# Patient Record
Sex: FEMALE | URBAN - METROPOLITAN AREA
[De-identification: names, ages, dates, MRNs, and addresses within clinical notes are randomized per-mention and may not be internally consistent; named-entity substitution may affect disease eponyms.]

---

## 2021-10-04 ENCOUNTER — APPOINTMENT (OUTPATIENT)
Dept: URBAN - METROPOLITAN AREA CLINIC 256 | Age: 81
Setting detail: DERMATOLOGY
End: 2021-10-04

## 2021-10-04 VITALS — RESPIRATION RATE: 14 BRPM | HEIGHT: 64 IN | WEIGHT: 135 LBS

## 2021-10-04 DIAGNOSIS — L40.0 PSORIASIS VULGARIS: ICD-10-CM

## 2021-10-04 PROCEDURE — OTHER PRESCRIPTION: OTHER

## 2021-10-04 PROCEDURE — OTHER COUNSELING: OTHER

## 2021-10-04 PROCEDURE — 99203 OFFICE O/P NEW LOW 30 MIN: CPT

## 2021-10-04 RX ORDER — BETAMETHASONE DIPROPIONATE 0.5 MG/ML
0.05% LOTION TOPICAL
Qty: 60 | Refills: 3 | Status: ERX | COMMUNITY
Start: 2021-10-04

## 2021-10-04 ASSESSMENT — LOCATION SIMPLE DESCRIPTION DERM
LOCATION SIMPLE: LEFT SCALP
LOCATION SIMPLE: RIGHT UPPER BACK

## 2021-10-04 ASSESSMENT — LOCATION DETAILED DESCRIPTION DERM
LOCATION DETAILED: RIGHT INFERIOR UPPER BACK
LOCATION DETAILED: LEFT CENTRAL FRONTAL SCALP

## 2021-10-04 ASSESSMENT — LOCATION ZONE DERM
LOCATION ZONE: TRUNK
LOCATION ZONE: SCALP

## 2022-06-06 ENCOUNTER — APPOINTMENT (OUTPATIENT)
Dept: URBAN - METROPOLITAN AREA CLINIC 256 | Age: 82
Setting detail: DERMATOLOGY
End: 2022-06-07

## 2022-06-06 VITALS — WEIGHT: 135 LBS | HEIGHT: 64 IN

## 2022-06-06 DIAGNOSIS — L40.0 PSORIASIS VULGARIS: ICD-10-CM

## 2022-06-06 PROCEDURE — OTHER COUNSELING: OTHER

## 2022-06-06 PROCEDURE — 99213 OFFICE O/P EST LOW 20 MIN: CPT

## 2022-06-06 PROCEDURE — OTHER PRESCRIPTION: OTHER

## 2022-06-06 RX ORDER — KETOCONAZOLE 20 MG/ML
APPLY THIN LAYER SHAMPOO, SUSPENSION TOPICAL
Qty: 120 | Refills: 6 | Status: ERX | COMMUNITY
Start: 2022-06-06

## 2022-06-06 RX ORDER — CLOBETASOL PROPIONATE 0.5 MG/ML
APPLY THIN LAYER SOLUTION TOPICAL
Qty: 25 | Refills: 6 | Status: ERX | COMMUNITY
Start: 2022-06-06

## 2022-06-06 ASSESSMENT — LOCATION DETAILED DESCRIPTION DERM
LOCATION DETAILED: LEFT CENTRAL FRONTAL SCALP
LOCATION DETAILED: RIGHT INFERIOR UPPER BACK
LOCATION DETAILED: RIGHT INFERIOR MEDIAL UPPER BACK
LOCATION DETAILED: LEFT MEDIAL FRONTAL SCALP

## 2022-06-06 ASSESSMENT — LOCATION SIMPLE DESCRIPTION DERM
LOCATION SIMPLE: RIGHT UPPER BACK
LOCATION SIMPLE: LEFT SCALP
LOCATION SIMPLE: RIGHT UPPER BACK
LOCATION SIMPLE: LEFT SCALP

## 2022-06-06 ASSESSMENT — LOCATION ZONE DERM
LOCATION ZONE: TRUNK
LOCATION ZONE: TRUNK
LOCATION ZONE: SCALP
LOCATION ZONE: SCALP

## 2023-03-29 ENCOUNTER — LAB REQUISITION (OUTPATIENT)
Dept: LAB | Facility: CLINIC | Age: 83
End: 2023-03-29

## 2023-03-29 PROCEDURE — 86481 TB AG RESPONSE T-CELL SUSP: CPT

## 2023-03-29 PROCEDURE — 36415 COLL VENOUS BLD VENIPUNCTURE: CPT

## 2023-03-31 LAB
GAMMA INTERFERON BACKGROUND BLD IA-ACNC: 0.02 IU/ML
M TB IFN-G BLD-IMP: NEGATIVE
M TB IFN-G CD4+ BCKGRND COR BLD-ACNC: 9.98 IU/ML
MITOGEN IGNF BCKGRD COR BLD-ACNC: 0 IU/ML
MITOGEN IGNF BCKGRD COR BLD-ACNC: 0.23 IU/ML
QUANTIFERON MITOGEN: 10 IU/ML
QUANTIFERON NIL TUBE: 0.02 IU/ML
QUANTIFERON TB1 TUBE: 0.02 IU/ML
QUANTIFERON TB2 TUBE: 0.25

## 2023-08-17 ENCOUNTER — APPOINTMENT (OUTPATIENT)
Dept: URBAN - METROPOLITAN AREA CLINIC 256 | Age: 83
Setting detail: DERMATOLOGY
End: 2023-08-17

## 2023-08-17 VITALS — HEIGHT: 64 IN | WEIGHT: 135 LBS

## 2023-08-17 DIAGNOSIS — L65.9 NONSCARRING HAIR LOSS, UNSPECIFIED: ICD-10-CM

## 2023-08-17 PROCEDURE — OTHER MIPS QUALITY: OTHER

## 2023-08-17 PROCEDURE — OTHER OTC TREATMENT REGIMEN: OTHER

## 2023-08-17 PROCEDURE — 99213 OFFICE O/P EST LOW 20 MIN: CPT

## 2023-08-17 PROCEDURE — OTHER COUNSELING: OTHER

## 2023-08-17 PROCEDURE — OTHER ADDITIONAL NOTES: OTHER

## 2023-08-17 ASSESSMENT — LOCATION SIMPLE DESCRIPTION DERM
LOCATION SIMPLE: POSTERIOR SCALP
LOCATION SIMPLE: SUPERIOR FOREHEAD

## 2023-08-17 ASSESSMENT — LOCATION DETAILED DESCRIPTION DERM
LOCATION DETAILED: SUPERIOR MID FOREHEAD
LOCATION DETAILED: POSTERIOR MID-PARIETAL SCALP

## 2023-08-17 ASSESSMENT — LOCATION ZONE DERM
LOCATION ZONE: FACE
LOCATION ZONE: SCALP

## 2023-08-17 NOTE — HPI: HAIR LOSS
How Did The Hair Loss Occur?: sudden in onset
Additional History: Patient states she was diagnosed with metastatic colon cancer in June 2023. She has decided not to treat the cancer. Patient states in March she lost her job of 30 years.

## 2023-08-17 NOTE — PROCEDURE: OTC TREATMENT REGIMEN
Patient Specific Otc Recommendations (Will Not Stick From Patient To Patient): Apply minoxidil 5% solution or foam to scalp once to twice daily.
Detail Level: Zone

## 2023-08-17 NOTE — PROCEDURE: ADDITIONAL NOTES
Detail Level: Simple
Additional Notes: - Discussed diagnosis such as telogen effluvium due to declining health from colon cancer. Differential diagnosis also includes androgenic alopecia which is caused by age or hormones. \\n- Discussed punch biopsy to determine definitive diagnosis, but patient defers at this time\\n- Patient inquired about prednisone. Discussed prednisone is not ideal for patient’s current condition as her scalp is not inflamed and prednisone isn't a treatment option for hair loss.\\n- Discussed trialing topical minoxidil 5% foam or solution for 2-3 months. If not improved at next visit, may consider biopsy or spironolactone.
Render Risk Assessment In Note?: no

## 2023-10-06 ENCOUNTER — APPOINTMENT (OUTPATIENT)
Dept: URBAN - METROPOLITAN AREA CLINIC 253 | Age: 83
Setting detail: DERMATOLOGY
End: 2023-10-06

## 2023-10-06 VITALS — RESPIRATION RATE: 14 BRPM | HEIGHT: 64 IN | WEIGHT: 140 LBS

## 2023-10-06 DIAGNOSIS — L65.9 NONSCARRING HAIR LOSS, UNSPECIFIED: ICD-10-CM

## 2023-10-06 PROCEDURE — OTHER PRESCRIPTION: OTHER

## 2023-10-06 PROCEDURE — OTHER OTC TREATMENT REGIMEN: OTHER

## 2023-10-06 PROCEDURE — OTHER COUNSELING: OTHER

## 2023-10-06 PROCEDURE — 99213 OFFICE O/P EST LOW 20 MIN: CPT

## 2023-10-06 PROCEDURE — OTHER ADDITIONAL NOTES: OTHER

## 2023-10-06 PROCEDURE — OTHER MIPS QUALITY: OTHER

## 2023-10-06 RX ORDER — FINASTERIDE 1 MG/1
1 MG TABLET, FILM COATED ORAL QD
Qty: 60 | Refills: 0 | Status: ERX | COMMUNITY
Start: 2023-10-06

## 2023-10-06 ASSESSMENT — LOCATION ZONE DERM
LOCATION ZONE: SCALP
LOCATION ZONE: FACE

## 2023-10-06 ASSESSMENT — LOCATION DETAILED DESCRIPTION DERM
LOCATION DETAILED: SUPERIOR MID FOREHEAD
LOCATION DETAILED: POSTERIOR MID-PARIETAL SCALP

## 2023-10-06 ASSESSMENT — LOCATION SIMPLE DESCRIPTION DERM
LOCATION SIMPLE: POSTERIOR SCALP
LOCATION SIMPLE: SUPERIOR FOREHEAD

## 2023-10-06 NOTE — PROCEDURE: MIPS QUALITY
Quality 111:Pneumonia Vaccination Status For Older Adults: Patient did not receive any pneumococcal conjugate or polysaccharide vaccine on or after their 60th birthday and before the end of the measurement period
Quality 226: Preventive Care And Screening: Tobacco Use: Screening And Cessation Intervention: Patient screened for tobacco use and is an ex/non-smoker
Quality 110: Preventive Care And Screening: Influenza Immunization: Influenza immunization was not ordered or administered, reason not given
Detail Level: Detailed
Quality 47: Advance Care Plan: Advance care planning not documented, reason not otherwise specified.
Quality 431: Preventive Care And Screening: Unhealthy Alcohol Use - Screening: Patient not identified as an unhealthy alcohol user when screened for unhealthy alcohol use using a systematic screening method
Quality 130: Documentation Of Current Medications In The Medical Record: Current Medications Documented

## 2023-10-06 NOTE — PROCEDURE: OTC TREATMENT REGIMEN
Patient Specific Otc Recommendations (Will Not Stick From Patient To Patient): topical minoxidil once to twice daily (twice daily is optimal).
Detail Level: Zone

## 2023-10-06 NOTE — PROCEDURE: ADDITIONAL NOTES
Additional Notes: Discussed that finasteride is likely a safer option than oral minoxidil due to risk of hypotension and falling. Informed patient that the addition of minoxidil could be considered in the future if the finasteride is not working as the patient is very interested in oral minoxidil. \\nRecommended patient look into wigs as they reported they are interested in this. Advised patient to inquire about extensions with a  to determine if the patient would be satisfied with the results. Recommended Guillermina Burns in [facility] and [facility] and [employer] for wigs.\\nDiscussed hair loss may be due to her metastic cancer and stress associated with this \\n\\nA clinical assistant was present for the exam. Care instructions were explained to the patient in detail. Told patient to call with any concerns or questions. The patient verbalized understanding and agreement of the education provided and the treatment plan. Encouraged patient to schedule a follow up appointment right after visit. At the end of the visit, all questions had been answered and the patient was satisfied with the visit. Additional Notes: Discussed that finasteride is likely a safer option than oral minoxidil due to risk of hypotension and falling. Informed patient that the addition of minoxidil could be considered in the future if the finasteride is not working as the patient is very interested in oral minoxidil. \\nRecommended patient look into wigs as they reported they are interested in this. Advised patient to inquire about extensions with a  to determine if the patient would be satisfied with the results. Recommended Guillermina Burns in [employer] and [employer] and [employer] for wigs.\\nDiscussed hair loss may be due to her metastic cancer and stress associated with this \\n\\nA clinical assistant was present for the exam. Care instructions were explained to the patient in detail. Told patient to call with any concerns or questions. The patient verbalized understanding and agreement of the education provided and the treatment plan. Encouraged patient to schedule a follow up appointment right after visit. At the end of the visit, all questions had been answered and the patient was satisfied with the visit.

## 2023-11-17 ENCOUNTER — RX ONLY (RX ONLY)
Age: 83
End: 2023-11-17

## 2023-11-17 RX ORDER — FINASTERIDE 1 MG/1
1 MG TABLET, FILM COATED ORAL QD
Qty: 30 | Refills: 0 | Status: ERX

## 2023-11-21 ENCOUNTER — APPOINTMENT (OUTPATIENT)
Dept: URBAN - METROPOLITAN AREA CLINIC 253 | Age: 83
Setting detail: DERMATOLOGY
End: 2023-11-21

## 2023-11-21 VITALS — WEIGHT: 135 LBS | RESPIRATION RATE: 14 BRPM | HEIGHT: 64 IN

## 2023-11-21 DIAGNOSIS — L65.9 NONSCARRING HAIR LOSS, UNSPECIFIED: ICD-10-CM

## 2023-11-21 PROCEDURE — OTHER PRESCRIPTION: OTHER

## 2023-11-21 PROCEDURE — 99213 OFFICE O/P EST LOW 20 MIN: CPT

## 2023-11-21 PROCEDURE — OTHER MIPS QUALITY: OTHER

## 2023-11-21 PROCEDURE — OTHER COUNSELING: OTHER

## 2023-11-21 PROCEDURE — OTHER ADDITIONAL NOTES: OTHER

## 2023-11-21 RX ORDER — FINASTERIDE 5 MG/1
TABLET, FILM COATED ORAL
Qty: 60 | Refills: 5 | Status: ERX | COMMUNITY
Start: 2023-11-21

## 2023-11-21 ASSESSMENT — LOCATION DETAILED DESCRIPTION DERM
LOCATION DETAILED: POSTERIOR MID-PARIETAL SCALP
LOCATION DETAILED: SUPERIOR MID FOREHEAD

## 2023-11-21 ASSESSMENT — LOCATION ZONE DERM
LOCATION ZONE: FACE
LOCATION ZONE: SCALP

## 2023-11-21 ASSESSMENT — LOCATION SIMPLE DESCRIPTION DERM
LOCATION SIMPLE: SUPERIOR FOREHEAD
LOCATION SIMPLE: POSTERIOR SCALP

## 2023-11-21 NOTE — PROCEDURE: ADDITIONAL NOTES
Additional Notes: She declines any SEs including depression. She is now working with a therapist. No headaches, dizziness, abdominal pain. Follow up with [facility] for cancer next month. Happy with her wig.\\n\\nA clinical assistant was present for the exam. Care instructions were explained to the patient in detail. Told patient to call with any concerns or questions. The patient verbalized understanding and agreement of the education provided and the treatment plan. Encouraged patient to schedule a follow up appointment right after visit. At the end of the visit, all questions had been answered and the patient was satisfied with the visit. Additional Notes: She declines any SEs including depression. She is now working with a therapist. No headaches, dizziness, abdominal pain. Follow up with [facility] for cancer next month. Happy with her wig.\\n\\nA clinical assistant was present for the exam. Care instructions were explained to the patient in detail. Told patient to call with any concerns or questions. The patient verbalized understanding and agreement of the education provided and the treatment plan. Encouraged patient to schedule a follow up appointment right after visit. At the end of the visit, all questions had been answered and the patient was satisfied with the visit.

## 2023-11-21 NOTE — PROCEDURE: ADDITIONAL NOTES
New admit [facility].  Brigida Fritz md [phone]  EUGENIO Roberts  No precert or pa required per ishaan Ref#  [id number]  Admitted for choledocholithiasis and elevated lft's.   Notified bed control and they have already called the patient.  Pk/cc   Detail Level: Simple

## 2023-11-21 NOTE — PROCEDURE: MIPS QUALITY
Quality 431: Preventive Care And Screening: Unhealthy Alcohol Use - Screening: Patient not identified as an unhealthy alcohol user when screened for unhealthy alcohol use using a systematic screening method
Quality 226: Preventive Care And Screening: Tobacco Use: Screening And Cessation Intervention: Patient screened for tobacco use and is an ex/non-smoker
Quality 130: Documentation Of Current Medications In The Medical Record: Current Medications Documented
Detail Level: Detailed
[place]

## 2024-03-23 ENCOUNTER — HOSPITAL ENCOUNTER (EMERGENCY)
Facility: CLINIC | Age: 84
Discharge: HOME | End: 2024-03-23
Attending: PHYSICIAN ASSISTANT | Admitting: PHYSICIAN ASSISTANT
Payer: COMMERCIAL

## 2024-03-23 VITALS
HEART RATE: 123 BPM | WEIGHT: 143.74 LBS | BODY MASS INDEX: 24.54 KG/M2 | SYSTOLIC BLOOD PRESSURE: 127 MMHG | RESPIRATION RATE: 13 BRPM | DIASTOLIC BLOOD PRESSURE: 85 MMHG | HEIGHT: 64 IN | OXYGEN SATURATION: 96 % | TEMPERATURE: 97.6 F

## 2024-03-23 DIAGNOSIS — I48.21 PERMANENT ATRIAL FIBRILLATION (H): ICD-10-CM

## 2024-03-23 DIAGNOSIS — N93.9 VAGINAL BLEEDING: ICD-10-CM

## 2024-03-23 DIAGNOSIS — C53.9: ICD-10-CM

## 2024-03-23 LAB
ABO/RH(D): NORMAL
ALBUMIN SERPL BCG-MCNC: 4.3 G/DL (ref 3.5–5.2)
ALP SERPL-CCNC: 93 U/L (ref 40–150)
ALT SERPL W P-5'-P-CCNC: 10 U/L (ref 0–50)
ANION GAP SERPL CALCULATED.3IONS-SCNC: 13 MMOL/L (ref 7–15)
ANTIBODY SCREEN: NEGATIVE
APTT PPP: 45 SECONDS (ref 22–38)
AST SERPL W P-5'-P-CCNC: 16 U/L (ref 0–45)
BASOPHILS # BLD AUTO: 0.1 10E3/UL (ref 0–0.2)
BASOPHILS NFR BLD AUTO: 1 %
BILIRUB SERPL-MCNC: 0.5 MG/DL
BUN SERPL-MCNC: 23.6 MG/DL (ref 8–23)
CALCIUM SERPL-MCNC: 9.8 MG/DL (ref 8.8–10.2)
CHLORIDE SERPL-SCNC: 101 MMOL/L (ref 98–107)
CREAT SERPL-MCNC: 0.91 MG/DL (ref 0.51–0.95)
DEPRECATED HCO3 PLAS-SCNC: 26 MMOL/L (ref 22–29)
EGFRCR SERPLBLD CKD-EPI 2021: 63 ML/MIN/1.73M2
EOSINOPHIL # BLD AUTO: 0.3 10E3/UL (ref 0–0.7)
EOSINOPHIL NFR BLD AUTO: 4 %
ERYTHROCYTE [DISTWIDTH] IN BLOOD BY AUTOMATED COUNT: 15.8 % (ref 10–15)
GLUCOSE SERPL-MCNC: 146 MG/DL (ref 70–99)
HCT VFR BLD AUTO: 39 % (ref 35–47)
HGB BLD-MCNC: 13.2 G/DL (ref 11.7–15.7)
HOLD SPECIMEN: NORMAL
IMM GRANULOCYTES # BLD: 0 10E3/UL
IMM GRANULOCYTES NFR BLD: 0 %
INR PPP: 1.39 (ref 0.85–1.15)
LYMPHOCYTES # BLD AUTO: 1.3 10E3/UL (ref 0.8–5.3)
LYMPHOCYTES NFR BLD AUTO: 16 %
MCH RBC QN AUTO: 31.5 PG (ref 26.5–33)
MCHC RBC AUTO-ENTMCNC: 33.8 G/DL (ref 31.5–36.5)
MCV RBC AUTO: 93 FL (ref 78–100)
MONOCYTES # BLD AUTO: 0.7 10E3/UL (ref 0–1.3)
MONOCYTES NFR BLD AUTO: 9 %
NEUTROPHILS # BLD AUTO: 5.4 10E3/UL (ref 1.6–8.3)
NEUTROPHILS NFR BLD AUTO: 70 %
NRBC # BLD AUTO: 0 10E3/UL
NRBC BLD AUTO-RTO: 0 /100
PLATELET # BLD AUTO: 257 10E3/UL (ref 150–450)
POTASSIUM SERPL-SCNC: 3.5 MMOL/L (ref 3.4–5.3)
PROT SERPL-MCNC: 7.7 G/DL (ref 6.4–8.3)
RBC # BLD AUTO: 4.19 10E6/UL (ref 3.8–5.2)
SODIUM SERPL-SCNC: 140 MMOL/L (ref 135–145)
SPECIMEN EXPIRATION DATE: NORMAL
WBC # BLD AUTO: 7.7 10E3/UL (ref 4–11)

## 2024-03-23 PROCEDURE — 36415 COLL VENOUS BLD VENIPUNCTURE: CPT | Performed by: PHYSICIAN ASSISTANT

## 2024-03-23 PROCEDURE — 99203 OFFICE O/P NEW LOW 30 MIN: CPT | Performed by: OBSTETRICS & GYNECOLOGY

## 2024-03-23 PROCEDURE — 99284 EMERGENCY DEPT VISIT MOD MDM: CPT | Mod: 25

## 2024-03-23 PROCEDURE — 85610 PROTHROMBIN TIME: CPT | Performed by: PHYSICIAN ASSISTANT

## 2024-03-23 PROCEDURE — 258N000003 HC RX IP 258 OP 636: Performed by: PHYSICIAN ASSISTANT

## 2024-03-23 PROCEDURE — 93005 ELECTROCARDIOGRAM TRACING: CPT

## 2024-03-23 PROCEDURE — 86900 BLOOD TYPING SEROLOGIC ABO: CPT | Performed by: PHYSICIAN ASSISTANT

## 2024-03-23 PROCEDURE — 96360 HYDRATION IV INFUSION INIT: CPT

## 2024-03-23 PROCEDURE — 85730 THROMBOPLASTIN TIME PARTIAL: CPT | Performed by: PHYSICIAN ASSISTANT

## 2024-03-23 PROCEDURE — 96361 HYDRATE IV INFUSION ADD-ON: CPT

## 2024-03-23 PROCEDURE — 85004 AUTOMATED DIFF WBC COUNT: CPT | Performed by: PHYSICIAN ASSISTANT

## 2024-03-23 PROCEDURE — 80053 COMPREHEN METABOLIC PANEL: CPT | Performed by: PHYSICIAN ASSISTANT

## 2024-03-23 RX ORDER — FERRIC SUBSULFATE 0.21 G/G
LIQUID TOPICAL ONCE
Status: DISCONTINUED | OUTPATIENT
Start: 2024-03-23 | End: 2024-03-23

## 2024-03-23 RX ORDER — FERRIC SUBSULFATE 0.21 G/G
LIQUID TOPICAL ONCE
Qty: 8 ML | Refills: 0 | Status: DISCONTINUED | OUTPATIENT
Start: 2024-03-23 | End: 2024-03-23 | Stop reason: HOSPADM

## 2024-03-23 RX ADMIN — SODIUM CHLORIDE 1000 ML: 9 INJECTION, SOLUTION INTRAVENOUS at 12:49

## 2024-03-23 ASSESSMENT — COLUMBIA-SUICIDE SEVERITY RATING SCALE - C-SSRS
1. IN THE PAST MONTH, HAVE YOU WISHED YOU WERE DEAD OR WISHED YOU COULD GO TO SLEEP AND NOT WAKE UP?: NO
2. HAVE YOU ACTUALLY HAD ANY THOUGHTS OF KILLING YOURSELF IN THE PAST MONTH?: NO
6. HAVE YOU EVER DONE ANYTHING, STARTED TO DO ANYTHING, OR PREPARED TO DO ANYTHING TO END YOUR LIFE?: NO

## 2024-03-23 ASSESSMENT — ACTIVITIES OF DAILY LIVING (ADL)
ADLS_ACUITY_SCORE: 35

## 2024-03-23 NOTE — PROGRESS NOTES
GYN consult    83-year-old with known metastatic colon cancer with extension to the cervix presented to [facility] ED today with vaginal bleeding    I was asked to see the patient in consultation for control of her vaginal bleeding.    Her medical records were reviewed.  A cervical biopsy had been performed on 3/6/2024 by Dr. Ge Richards at [facility].  The results were consistent with metastatic colon cancer.  Bleeding after that was controlled with Monsel solution.    She had represented with bleeding to Dr. Richards 2 days ago and again bleeding was controlled with Monsel solution.    She was advised to follow-up with her oncologist regarding a palliative treatment plan.    I was contacted by the ED after they noticed vaginal bleeding in a patient who is slightly tachycardic with a stable hemoglobin.    Physical examination was limited to the pelvis.  The external genitalia show typical postmenopausal changes.  Speculum examination showed a scant amount of dark blood in the vault.  At the cervical os there was a 3 to 4 cm necrotic appearing tumor with no active bleeding but slight venous ooze.  Monsel solution was applied with a sponge stick and then with a large Q-tip to apply pressure and bleeding quickly stopped.  I simply observed for bleeding for several minutes with no additional bleeding occurring and then gently remove the speculum.    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  They seem very aware that there is metastatic tumor at the cervix and that palliative treatment is warranted.  They have been very involved with her oncologist regarding this and I advised a update her on recent occurrences and see if treatment can be expedited.    I did inform them that the likelihood of this recurring is significant and any severe bleeding would again warrant emergency room evaluation.  This would be better addressed at an  institution with GYN oncology and/or radiation oncology available and she should look for advice from her oncologist as to the best institution.    Assessment: Metastatic colon cancer with extension to the cervix, biopsy proven.    Bleeding controlled with silver nitrate although this is expected to be a temporary solution and further management should be arranged in a timely fashion through her oncologist.    Mayco Rodgers MD OB/GYN

## 2024-03-23 NOTE — ED PROVIDER NOTES
History     Chief Complaint:  Vaginal Bleeding       The history is provided by the patient.      Kristina Ramirez is a 83 year old female, with a history of persistent atrial fibrillation on Eliquis and Coreg twice daily 25 mg, colon cancer with metastasis to cervical area, not currently on chemotherapy who presents for evaluation of vaginal bleeding.  The patient reports she has been dealing with some vaginal bleeding for several days now.  She was seen by her OB/GYN 2 days ago at which time they did a procedure to help stop that temporarily but the bleeding restarted and returned and seems heavier today.  Notes her heart is fast but it often does go fast especially when she is anxious due to her A-fib with RVR.  She does not feel dizzy, denies chest pain,  short of breath or lightheaded.  She denies abdominal or pelvic pain.  She last took her Eliquis dose this morning takes it twice daily for stroke prevention due to her A-fib but no history of strokes thromboembolism or heart valve replacement.  Also occasionally has small amount of blood in stool.  Independent Historian:   None - Patient Only    Review of External Notes:   I reviewed her OB note from 03/21/2024.  Note Pelaez solution utilized to cauterize apparent cervical malignancy wound bleeding. I also reviewed recent pelvic US from February showing fibroids.      Medications:    No current outpatient medications on file.      Past Medical History:    No past medical history on file.    Past Surgical History:    No past surgical history on file.     Physical Exam   Patient Vitals for the past 24 hrs:   BP Temp Temp src Pulse Resp SpO2 Height Weight   03/23/24 1517 -- -- -- (!) 123 -- -- -- --   03/23/24 1516 -- -- -- (!) 128 -- -- -- --   03/23/24 1515 -- -- -- (!) 123 -- -- -- --   03/23/24 1511 -- -- -- -- 13 96 % -- --   03/23/24 1332 -- -- -- 118 26 97 % -- --   03/23/24 1317 127/85 -- -- 113 23 95 % -- --   03/23/24 1232 128/85 -- -- (!) 122 30 97  "% -- --   03/23/24 1217 123/76 97.6  F (36.4  C) Temporal (!) 142 16 98 % 1.626 m (5' 4\") 65.2 kg (143 lb 11.8 oz)        Physical Exam  General: Awake, alert, non-toxic.  Head:  Scalp is NC/AT  Eyes:  Conjunctiva normal, PERRL  ENT:  The external nose and ears are normal.   Neck:  Normal range of motion without rigidity.  CV:  Tachycardic and irregularly irregular    No pathologic murmur, rubs, or gallops.  Resp:  Breath sounds are clear bilaterally    Non-labored, no retractions or accessory muscle use  Pelvic:  Normal external genitalia.  There is bleeding from abnormal appearing cervical tissue with likely tumorous growth.  This is mild to moderate.  KAREN without signs of active bleeding. (Performed in presence of female chaparone EDT)  Abdomen: Abdomen is soft, no distension, no tenderness, no masses.   MS:  No lower extremity edema/swelling.   Skin:  Warm and dry, No rash or lesions noted.  Neuro:  Alert and oriented.  GCS 15 Moves all extremities normal.  No facial asymmetry. Gait normal.  Psych: Awake. Alert. Normal affect. Appropriate interactions.      Emergency Department Course     ECG  ECG taken at 1232, ECG read at 1247  Atril fibrillation with rapid ventricular response  Low voltage QRS  Incomplete right bundle branch block  Septal infarct, age undetermined  Rate 109 bpm. UT interval * ms. QRS duration 92 ms. QT/QTc 358/482 ms. P-R-T axes * 10 -11.       Laboratory:  Labs Ordered and Resulted from Time of ED Arrival to Time of ED Departure   COMPREHENSIVE METABOLIC PANEL - Abnormal       Result Value    Sodium 140      Potassium 3.5      Carbon Dioxide (CO2) 26      Anion Gap 13      Urea Nitrogen 23.6 (*)     Creatinine 0.91      GFR Estimate 63      Calcium 9.8      Chloride 101      Glucose 146 (*)     Alkaline Phosphatase 93      AST 16      ALT 10      Protein Total 7.7      Albumin 4.3      Bilirubin Total 0.5     INR - Abnormal    INR 1.39 (*)    PARTIAL THROMBOPLASTIN TIME - Abnormal    aPTT 45 " (*)    CBC WITH PLATELETS AND DIFFERENTIAL - Abnormal    WBC Count 7.7      RBC Count 4.19      Hemoglobin 13.2      Hematocrit 39.0      MCV 93      MCH 31.5      MCHC 33.8      RDW 15.8 (*)     Platelet Count 257      % Neutrophils 70      % Lymphocytes 16      % Monocytes 9      % Eosinophils 4      % Basophils 1      % Immature Granulocytes 0      NRBCs per 100 WBC 0      Absolute Neutrophils 5.4      Absolute Lymphocytes 1.3      Absolute Monocytes 0.7      Absolute Eosinophils 0.3      Absolute Basophils 0.1      Absolute Immature Granulocytes 0.0      Absolute NRBCs 0.0     TYPE AND SCREEN, ADULT    ABO/RH(D) O POS      Antibody Screen Negative      SPECIMEN EXPIRATION DATE [id number]     ABO/RH TYPE AND SCREEN            Emergency Department Course & Assessments:    Interventions:  Medications   sodium chloride 0.9% BOLUS 1,000 mL (0 mLs Intravenous Stopped 3/23/24 1518)        Assessments:  1229 I examined the patient and obtained history as noted above.  I rechecked the patient.  Independent Interpretation (X-rays, CTs, rhythm strip):  NOne    Consultations/Discussion of Management or Tests:  I spoke with Dr. Vishal SAMANO who graciously came in to evaluate the patient.  He performed cautery of the cervical lesions and successful cessation of bleeding.  He advises extensive malignant tissue seen.       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  Disposition:  The patient was discharged.     Impression & Plan    CMS Diagnoses: None      MIPS (If applicable):  N/A    Medical Decision Makin-year-old female with a history of persistent A-fib on Eliquis as well as colon cancer with metastasis to cervix not on chemo who presents for evaluation of vaginal bleeding.  Found to be bleeding from several sites to the cervix.  These are concerning for malignancy.  Was just seen by OB 2 days ago prior and underwent ellis  solution application.  I did discuss with on-call OB/GYN who graciously came in to evaluate the patient and performed exam and was able to stop bleeding.  Patient in A-fib with RVR certainly could be some component of blood loss contributing to this however pressure stable hemoglobin and platelets normal and given cessation of bleeding at this time the patient declines further observation would like to go home understands the potential risks associated with this but I think this is reasonable given bleeding has stopped at this time.  Feels there is likely some component of anxiety to the RVR at this time which is certainly possible.  She has no fever or leukocytosis or infectious symptoms to suggest sepsis and has a benign exam without tenderness I do not think there is any indication for emergent abdominal imaging.  She has no chest pain or shortness of breath to suggest cardiopulmonary process.  There is no evidence of other active bleeding or GI bleed though certainly could have low-grade GI bleed due to her known colon malignancies.  Coags mildly elevated likely due to her Eliquis.  Since she is only on Eliquis for stroke prevention of A-fib we have decided to hold this for time being as risk of bleeding likely outweighs benefit.  She is in agreement with this.  She will also take another half dose of her Coreg to help bring down her heart rates declines additional medicine here.  Was previously on digoxin but when off due to low heart rates might need to consider going back on higher dose or other medication if more persistent issues with tachycardia and she was tachycardic at recent visit.  Return immediately for recurrence of bleeding, dizziness, shortness of breath, pain, fever, chest pain or other new or worsening symptoms      Diagnosis:    ICD-10-CM    1. Vaginal bleeding  N93.9       2. Permanent atrial fibrillation (H)  I48.21       3. Cervical malignancy (H)  C53.9            Discharge  Medications:  There are no discharge medications for this patient.       Scribe Disclosure:  I, Jonevernon Marshall, am serving as a scribe at 12:35 PM on 3/23/2024 to document services personally performed by Walker Dennis PA-C based on my observations and the provider's statements to me.     3/23/2024   Walker Dennis PA-C Etten, Clark Ellsworth, PA-C  03/23/24 1808

## 2024-03-23 NOTE — DISCHARGE INSTRUCTIONS
Hold your Eliquis until you talk to your doctor on Monday.  You should take another half dose of your carvedilol when you get home and then take her normal nightly dose.  If your heart rates are persistently high or if your bleeding restarts or if you feel short of breath dizzy or have other symptoms or concerns you should return to the ER immediately.    Discharge Instructions  Vaginal Bleeding    You were seen today for unusual vaginal bleeding. Heavy or irregular bleeding may be caused by many different things such as hormone changes, infection, birth control, or cancer. At this time your provider does not find that your bleeding is a sign of anything dangerous or life-threatening, and you have not lost enough blood to be dangerous.  However, sometimes the signs of serious illness do not show up right away.  If you have new or worse symptoms, you may need to be seen again in the Emergency Department or by your primary provider.      Generally, every Emergency Department visit should have a follow-up clinic visit with either a primary or a specialty clinic/provider. Please follow-up as instructed by your emergency provider today.    Return to the Emergency Department if:  You feel lightheaded or faint.  Your bleeding becomes much heavier than it is now.  You have severe cramping or abdominal (belly) pain.  You have any new or different symptoms.    Treatment:  Motrin  or Advil  (ibuprofen) can help relieve cramps and can also decrease bleeding. You may use this according to the directions on the package. Do not use a medicine that you are allergic to, or if your provider has told you not to use it.  Hormone pills or birth control pills are occasionally prescribed to help control the bleeding but often this is left to an OB/GYN provider. These can cause problems if you have a history of blood clots or stroke, so tell your provider if you have these problems before you leave.  Iron tablets may be recommended if you  have anemia (low blood count.) Iron can cause constipation, so be sure to have plenty of fiber in your diet and let your provider know if you have problems.  Drinking plenty of fluid is important. Be sure to drink extra water or other healthy drinks.  If you were given a prescription for medicine here today, be sure to read all of the information (including the package insert) that comes with your prescription.  This will include important information about the medicine, its side effects, and any warnings that you need to know about.  The pharmacist who fills the prescription can provide more information and answer questions you may have about the medicine.  If you have questions or concerns that the pharmacist cannot address, please call or return to the Emergency Department.     Remember that you can always come back to the Emergency Department if you are not able to see your regular provider in the amount of time listed above, if you get any new symptoms, or if there is anything that worries you.

## 2024-03-25 LAB
ATRIAL RATE - MUSE: NORMAL BPM
DIASTOLIC BLOOD PRESSURE - MUSE: NORMAL MMHG
INTERPRETATION ECG - MUSE: NORMAL
P AXIS - MUSE: NORMAL DEGREES
PR INTERVAL - MUSE: NORMAL MS
QRS DURATION - MUSE: 92 MS
QT - MUSE: 358 MS
QTC - MUSE: 482 MS
R AXIS - MUSE: 10 DEGREES
SYSTOLIC BLOOD PRESSURE - MUSE: NORMAL MMHG
T AXIS - MUSE: -11 DEGREES
VENTRICULAR RATE- MUSE: 109 BPM

## 2024-09-09 ENCOUNTER — HOSPITAL ENCOUNTER (INPATIENT)
Facility: CLINIC | Age: 84
LOS: 1 days | Discharge: HOME | DRG: 309 | End: 2024-09-10
Attending: EMERGENCY MEDICINE | Admitting: INTERNAL MEDICINE
Payer: COMMERCIAL

## 2024-09-09 DIAGNOSIS — I48.91 ATRIAL FIBRILLATION WITH RVR (H): ICD-10-CM

## 2024-09-09 LAB
ANION GAP SERPL CALCULATED.3IONS-SCNC: 14 MMOL/L (ref 7–15)
BASOPHILS # BLD AUTO: 0.1 10E3/UL (ref 0–0.2)
BASOPHILS NFR BLD AUTO: 1 %
BUN SERPL-MCNC: 28.6 MG/DL (ref 8–23)
CALCIUM SERPL-MCNC: 10.2 MG/DL (ref 8.8–10.4)
CHLORIDE SERPL-SCNC: 95 MMOL/L (ref 98–107)
CREAT SERPL-MCNC: 1.28 MG/DL (ref 0.51–0.95)
EGFRCR SERPLBLD CKD-EPI 2021: 42 ML/MIN/1.73M2
EOSINOPHIL # BLD AUTO: 0.3 10E3/UL (ref 0–0.7)
EOSINOPHIL NFR BLD AUTO: 3 %
ERYTHROCYTE [DISTWIDTH] IN BLOOD BY AUTOMATED COUNT: 15.4 % (ref 10–15)
GLUCOSE SERPL-MCNC: 166 MG/DL (ref 70–99)
HCO3 SERPL-SCNC: 27 MMOL/L (ref 22–29)
HCT VFR BLD AUTO: 40.9 % (ref 35–47)
HGB BLD-MCNC: 13.3 G/DL (ref 11.7–15.7)
HOLD SPECIMEN: NORMAL
HOLD SPECIMEN: NORMAL
IMM GRANULOCYTES # BLD: 0.1 10E3/UL
IMM GRANULOCYTES NFR BLD: 1 %
LYMPHOCYTES # BLD AUTO: 2.1 10E3/UL (ref 0.8–5.3)
LYMPHOCYTES NFR BLD AUTO: 20 %
MCH RBC QN AUTO: 31.5 PG (ref 26.5–33)
MCHC RBC AUTO-ENTMCNC: 32.5 G/DL (ref 31.5–36.5)
MCV RBC AUTO: 97 FL (ref 78–100)
MONOCYTES # BLD AUTO: 1 10E3/UL (ref 0–1.3)
MONOCYTES NFR BLD AUTO: 9 %
NEUTROPHILS # BLD AUTO: 7.1 10E3/UL (ref 1.6–8.3)
NEUTROPHILS NFR BLD AUTO: 68 %
NRBC # BLD AUTO: 0 10E3/UL
NRBC BLD AUTO-RTO: 0 /100
PLATELET # BLD AUTO: 383 10E3/UL (ref 150–450)
POTASSIUM SERPL-SCNC: 3.9 MMOL/L (ref 3.4–5.3)
RBC # BLD AUTO: 4.22 10E6/UL (ref 3.8–5.2)
SODIUM SERPL-SCNC: 136 MMOL/L (ref 135–145)
TROPONIN T SERPL HS-MCNC: 12 NG/L
WBC # BLD AUTO: 10.5 10E3/UL (ref 4–11)

## 2024-09-09 PROCEDURE — 250N000013 HC RX MED GY IP 250 OP 250 PS 637: Performed by: EMERGENCY MEDICINE

## 2024-09-09 PROCEDURE — 99222 1ST HOSP IP/OBS MODERATE 55: CPT | Performed by: INTERNAL MEDICINE

## 2024-09-09 PROCEDURE — 96361 HYDRATE IV INFUSION ADD-ON: CPT | Mod: 59

## 2024-09-09 PROCEDURE — 85004 AUTOMATED DIFF WBC COUNT: CPT | Performed by: EMERGENCY MEDICINE

## 2024-09-09 PROCEDURE — 5A2204Z RESTORATION OF CARDIAC RHYTHM, SINGLE: ICD-10-PCS | Performed by: EMERGENCY MEDICINE

## 2024-09-09 PROCEDURE — 999N000055 HC STATISTIC END TITIAL CO2 MONITORING

## 2024-09-09 PROCEDURE — 80048 BASIC METABOLIC PNL TOTAL CA: CPT | Performed by: EMERGENCY MEDICINE

## 2024-09-09 PROCEDURE — 93005 ELECTROCARDIOGRAM TRACING: CPT | Mod: XU

## 2024-09-09 PROCEDURE — 999N000157 HC STATISTIC RCP TIME EA 10 MIN

## 2024-09-09 PROCEDURE — 96375 TX/PRO/DX INJ NEW DRUG ADDON: CPT

## 2024-09-09 PROCEDURE — 92960 CARDIOVERSION ELECTRIC EXT: CPT

## 2024-09-09 PROCEDURE — 36415 COLL VENOUS BLD VENIPUNCTURE: CPT | Performed by: EMERGENCY MEDICINE

## 2024-09-09 PROCEDURE — 250N000011 HC RX IP 250 OP 636: Mod: JW | Performed by: EMERGENCY MEDICINE

## 2024-09-09 PROCEDURE — 258N000003 HC RX IP 258 OP 636: Performed by: EMERGENCY MEDICINE

## 2024-09-09 PROCEDURE — 96365 THER/PROPH/DIAG IV INF INIT: CPT

## 2024-09-09 PROCEDURE — 250N000009 HC RX 250: Performed by: EMERGENCY MEDICINE

## 2024-09-09 PROCEDURE — 84484 ASSAY OF TROPONIN QUANT: CPT | Performed by: EMERGENCY MEDICINE

## 2024-09-09 PROCEDURE — 120N000001 HC R&B MED SURG/OB

## 2024-09-09 PROCEDURE — 96376 TX/PRO/DX INJ SAME DRUG ADON: CPT

## 2024-09-09 PROCEDURE — 83735 ASSAY OF MAGNESIUM: CPT | Performed by: INTERNAL MEDICINE

## 2024-09-09 PROCEDURE — 99285 EMERGENCY DEPT VISIT HI MDM: CPT | Mod: 25

## 2024-09-09 RX ORDER — AMOXICILLIN 250 MG
1 CAPSULE ORAL 2 TIMES DAILY PRN
Status: DISCONTINUED | OUTPATIENT
Start: 2024-09-09 | End: 2024-09-10 | Stop reason: HOSPADM

## 2024-09-09 RX ORDER — BUMETANIDE 0.5 MG/1
1 TABLET ORAL DAILY
Status: DISCONTINUED | OUTPATIENT
Start: 2024-09-10 | End: 2024-09-10 | Stop reason: HOSPADM

## 2024-09-09 RX ORDER — METOPROLOL SUCCINATE 50 MG/1
100 TABLET, EXTENDED RELEASE ORAL DAILY
Status: DISCONTINUED | OUTPATIENT
Start: 2024-09-09 | End: 2024-09-10 | Stop reason: HOSPADM

## 2024-09-09 RX ORDER — AMOXICILLIN 250 MG
2 CAPSULE ORAL 2 TIMES DAILY PRN
Status: DISCONTINUED | OUTPATIENT
Start: 2024-09-09 | End: 2024-09-10 | Stop reason: HOSPADM

## 2024-09-09 RX ORDER — PROPOFOL 10 MG/ML
1 INJECTION, EMULSION INTRAVENOUS ONCE
Status: COMPLETED | OUTPATIENT
Start: 2024-09-09 | End: 2024-09-09

## 2024-09-09 RX ORDER — ONDANSETRON 2 MG/ML
4 INJECTION INTRAMUSCULAR; INTRAVENOUS EVERY 6 HOURS PRN
Status: DISCONTINUED | OUTPATIENT
Start: 2024-09-09 | End: 2024-09-10 | Stop reason: HOSPADM

## 2024-09-09 RX ORDER — ACETAMINOPHEN 650 MG/1
650 SUPPOSITORY RECTAL EVERY 4 HOURS PRN
Status: DISCONTINUED | OUTPATIENT
Start: 2024-09-09 | End: 2024-09-10 | Stop reason: HOSPADM

## 2024-09-09 RX ORDER — FLUMAZENIL 0.1 MG/ML
0.2 INJECTION, SOLUTION INTRAVENOUS
Status: ACTIVE | OUTPATIENT
Start: 2024-09-09 | End: 2024-09-10

## 2024-09-09 RX ORDER — METOPROLOL TARTRATE 50 MG
50 TABLET ORAL ONCE
Status: COMPLETED | OUTPATIENT
Start: 2024-09-09 | End: 2024-09-09

## 2024-09-09 RX ORDER — ONDANSETRON 4 MG/1
4 TABLET, ORALLY DISINTEGRATING ORAL EVERY 6 HOURS PRN
Status: DISCONTINUED | OUTPATIENT
Start: 2024-09-09 | End: 2024-09-10 | Stop reason: HOSPADM

## 2024-09-09 RX ORDER — ACETAMINOPHEN 325 MG/1
650 TABLET ORAL EVERY 4 HOURS PRN
Status: DISCONTINUED | OUTPATIENT
Start: 2024-09-09 | End: 2024-09-10 | Stop reason: HOSPADM

## 2024-09-09 RX ORDER — PROPOFOL 10 MG/ML
0.25 INJECTION, EMULSION INTRAVENOUS
Status: DISCONTINUED | OUTPATIENT
Start: 2024-09-09 | End: 2024-09-10

## 2024-09-09 RX ORDER — METOPROLOL TARTRATE 1 MG/ML
5 INJECTION, SOLUTION INTRAVENOUS EVERY 5 MIN PRN
Status: DISCONTINUED | OUTPATIENT
Start: 2024-09-09 | End: 2024-09-10 | Stop reason: HOSPADM

## 2024-09-09 RX ORDER — DILTIAZEM HCL/D5W 125 MG/125
5-15 PLASTIC BAG, INJECTION (ML) INTRAVENOUS CONTINUOUS
Status: DISCONTINUED | OUTPATIENT
Start: 2024-09-09 | End: 2024-09-10

## 2024-09-09 RX ORDER — CALCIUM CARBONATE 500 MG/1
1000 TABLET, CHEWABLE ORAL 4 TIMES DAILY PRN
Status: DISCONTINUED | OUTPATIENT
Start: 2024-09-09 | End: 2024-09-10 | Stop reason: HOSPADM

## 2024-09-09 RX ADMIN — METOPROLOL TARTRATE 5 MG: 5 INJECTION INTRAVENOUS at 18:57

## 2024-09-09 RX ADMIN — Medication 5 MG/HR: at 22:35

## 2024-09-09 RX ADMIN — METOPROLOL TARTRATE 50 MG: 50 TABLET, FILM COATED ORAL at 22:07

## 2024-09-09 RX ADMIN — SODIUM CHLORIDE 500 ML: 9 INJECTION, SOLUTION INTRAVENOUS at 18:57

## 2024-09-09 RX ADMIN — METOPROLOL TARTRATE 5 MG: 5 INJECTION INTRAVENOUS at 19:37

## 2024-09-09 RX ADMIN — PROPOFOL 60 MG: 10 INJECTION, EMULSION INTRAVENOUS at 21:08

## 2024-09-09 ASSESSMENT — COLUMBIA-SUICIDE SEVERITY RATING SCALE - C-SSRS
6. HAVE YOU EVER DONE ANYTHING, STARTED TO DO ANYTHING, OR PREPARED TO DO ANYTHING TO END YOUR LIFE?: NO
1. IN THE PAST MONTH, HAVE YOU WISHED YOU WERE DEAD OR WISHED YOU COULD GO TO SLEEP AND NOT WAKE UP?: NO
2. HAVE YOU ACTUALLY HAD ANY THOUGHTS OF KILLING YOURSELF IN THE PAST MONTH?: NO

## 2024-09-09 ASSESSMENT — ACTIVITIES OF DAILY LIVING (ADL)
ADLS_ACUITY_SCORE: 35

## 2024-09-09 NOTE — ED TRIAGE NOTES
Arrives from home. States she is having a rapid pulse and feels unwell, states this started this morning.     Triage EKG noted to have AFIB RVR.

## 2024-09-09 NOTE — ED PROVIDER NOTES
<<<<<Critical care time greater than 30 minutes>>>>      History     Chief Complaint:  Palpitations       HPI   Kristina Ramirez is a 83 year old female with history of atrial fibrillation on Eliquis, heart failure with preserved ejection fraction, and history of severe tricuspid regurgitation and metastatic colon cancer not on any treatment or chemotherapy who presents emergency department for evaluation of palpitations that began this morning at 8 AM.  She denies any chest pain, she reports some slight shortness of breath and dizziness.  Denies any fever, vomiting, diarrhea, cough or cold symptoms or recent infectious symptoms.  She reports that she was recently taken off Coreg and put back on metoprolol but only 50 mg.  She states that prior, she was on 200 mg/day.  She took an extra 50 mg this morning to see if that would help.  She also is on Eliquis      Independent Historian:    Patient only    Review of External Notes:  Reviewed cardiology note from 9/5/2024, patient was advised to stop metoprolol and carvedilol, recommended to have a Zio patch    Medications:    No current outpatient medications on file.      Past Medical History:    No past medical history on file.    Past Surgical History:    No past surgical history on file.       Physical Exam   Patient Vitals for the past 24 hrs:   BP Temp Temp src Pulse Resp SpO2 Height Weight   09/09/24 2206 -- -- -- 117 -- -- -- --   09/09/24 2156 (!) 143/100 -- -- 110 -- 96 % -- --   09/09/24 2149 (!) 149/107 -- -- 111 17 97 % -- --   09/09/24 2146 -- -- -- 115 -- -- -- --   09/09/24 2139 -- -- -- (!) 126 -- 98 % -- --   09/09/24 2136 -- -- -- 77 -- -- -- --   09/09/24 2129 138/76 -- -- 57 -- 95 % -- --   09/09/24 2116 (!) 141/70 -- -- 64 27 100 % -- --   09/09/24 2115 -- -- -- 58 -- -- -- --   09/09/24 2115 -- -- -- 51 (!) 38 100 % -- --   09/09/24 2111 -- -- -- (!) 43 (!) 66 100 % -- --   09/09/24 2108 -- -- -- (!) 135 24 99 % -- --   09/09/24 2015 -- -- --  "104 14 97 % -- --   09/09/24 2000 (!) 142/109 -- -- 120 11 97 % -- --   09/09/24 1935 (!) 137/108 -- -- (!) 124 -- 97 % -- --   09/09/24 1925 -- -- -- (!) 130 -- 97 % -- --   09/09/24 1915 (!) 133/98 -- -- 116 -- 96 % -- --   09/09/24 1905 (!) 143/112 -- -- (!) 124 -- 96 % -- --   09/09/24 1853 -- -- -- (!) 128 13 95 % -- --   09/09/24 1852 -- -- -- (!) 143 13 98 % -- --   09/09/24 1851 -- -- -- -- -- 97 % -- --   09/09/24 1841 -- -- -- -- -- -- 1.613 m (5' 3.5\") 60.3 kg (133 lb)   09/09/24 1830 124/87 97.5  F (36.4  C) Temporal (!) 166 20 99 % -- --        Physical Exam  GENERAL: Awake, alert  CARDIOVASCULAR: Tachycardic, irregularly irregular  LUNGS: Clear bilaterally, no wheezes rales or rhonchi  ABDOMEN: Soft, nontender, no rebound or guarding  EXTREMITIES: No peripheral edema  NEURO: Awake and alert, moves all extremities    Emergency Department Course   ECG  ECG taken at 6:24 PM, ECG read at 6:28 PM  Atrial fibrillation with RVR, no ST elevation  No significant changes when compared with prior ECG, dated 3/23/2024  Rate \155 bpm. ND interval n/a ms. QRS duration 84 ms. QT/QTc 286/459ms. P-R-T axesn/a 93 17    Imaging:  -Cardioversion External    (Results Pending)   -Cardioversion External    (Results Pending)       Laboratory:  Labs Ordered and Resulted from Time of ED Arrival to Time of ED Departure   BASIC METABOLIC PANEL - Abnormal       Result Value    Sodium 136      Potassium 3.9      Chloride 95 (*)     Carbon Dioxide (CO2) 27      Anion Gap 14      Urea Nitrogen 28.6 (*)     Creatinine 1.28 (*)     GFR Estimate 42 (*)     Calcium 10.2      Glucose 166 (*)    CBC WITH PLATELETS AND DIFFERENTIAL - Abnormal    WBC Count 10.5      RBC Count 4.22      Hemoglobin 13.3      Hematocrit 40.9      MCV 97      MCH 31.5      MCHC 32.5      RDW 15.4 (*)     Platelet Count 383      % Neutrophils 68      % Lymphocytes 20      % Monocytes 9      % Eosinophils 3      % Basophils 1      % Immature Granulocytes 1      " NRBCs per 100 WBC 0      Absolute Neutrophils 7.1      Absolute Lymphocytes 2.1      Absolute Monocytes 1.0      Absolute Eosinophils 0.3      Absolute Basophils 0.1      Absolute Immature Granulocytes 0.1      Absolute NRBCs 0.0     TROPONIN T, HIGH SENSITIVITY - Normal    Troponin T, High Sensitivity 12          [facility]    -Cardioversion External    Date/Time: 9/9/2024 10:27 PM    Performed by: Nati Orr MD  Authorized by: Nati Orr MD    Risks, benefits and alternatives discussed.    [facility]    -Cardioversion External    Date/Time: 9/9/2024 10:28 PM    Performed by: Nati Orr MD  Authorized by: Nati Orr MD    Risks, benefits and alternatives discussed.    ED EVALUATION:      Assessment Time: 9/9/2024 9:34 PM      I have performed an Emergency Department Evaluation including taking a history and physical examination, this evaluation will be documented in the electronic medical record for this ED encounter.      ASA Class: Class 2- mild systemic disease, no acute problems, no functional limitations    Mallampati: Grade 2- soft palate, base of uvula, tonsillar pillars, and portion of posterior pharyngeal wall visible    UNIVERSAL PROTOCOL   Site Marked: Yes  Prior Images Obtained and Reviewed:  Yes  Required items: Required blood products, implants, devices and special equipment available    Patient identity confirmed:  Arm band and verbally with patient  Patient was reevaluated immediately before administering moderate or deep sedation or anesthesia  Confirmation Checklist:  Patient's identity using two indicators, relevant allergies, procedure was appropriate and matched the consent or emergent situation and correct equipment/implants were available  Time out: Immediately prior to the procedure a time out was called    Universal Protocol: the Joint Commission Universal Protocol was followed    Preparation: Patient was prepped  and draped in usual sterile fashion      SEDATION  Patient Sedated: Yes    Sedation:  Propofol  Vital signs: Vital signs monitored during sedation      PRE-PROCEDURE DETAILS:     Cardioversion basis:  Elective    Rhythm:  Atrial fibrillation    Electrode placement:  Anterior-posterior  Attempt one:     Cardioversion mode:  Synchronous    Shock (Joules):  200    Shock outcome:  No change in rhythm  Attempt two:     Cardioversion mode:  Synchronous    Shock (Joules):  200    Shock outcome:  No change in rhythm  Attempt three:     Cardioversion mode:  Synchronous    Shock (Joules):  200    Shock outcome:  No change in rhythm  Post-procedure details:     Patient status:  Awake      PROCEDURE    Patient Tolerance:  Patient tolerated the procedure well with no immediate complications  Length of time physician/provider present for 1:1 monitoring during sedation: 15         Emergency Department Course & Assessments:    Interventions:  Medications   metoprolol (LOPRESSOR) injection 5 mg (5 mg Intravenous $Given 24)   flumazenil (ROMAZICON) injection 0.2 mg (has no administration in time range)   propofol (DIPRIVAN) injection 10 mg/mL vial (15 mg Intravenous Not Given 24)   diltiazem (CARDIZEM) 125 mg in dextrose 5 % 125 mL infusion (has no administration in time range)   sodium chloride 0.9% BOLUS 500 mL (0 mLs Intravenous Stopped 24)   propofol (DIPRIVAN) injection 10 mg/mL vial (60 mg Intravenous $Given 24)   metoprolol tartrate (LOPRESSOR) tablet 50 mg (50 mg Oral $Given 24)            Consultations/Discussion of Management or Tests:  Discussed with the ED pharmacistAmaya        Disposition:  Admit to hospitalist    Impression & Plan           Medical Decision Makin-year-old female, history of atrial fibrillation, on Eliquis who presents emergency department for evaluation of palpitations, noted to be in atrial fibrillation with RVR.  We initially tried some fluids and  some IV metoprolol x 3, no improvement in rhythm.  She was then sedated, cardioverted x 3 with 200 J without success.  At 1 point she briefly converted to sinus rhythm but then went back into atrial fibrillation.  Her rates were around 120 but went up to 140s when she ambulated afterwards.  We discussed that she may benefit from observation overnight on diltiazem given her elevated heart rates, and persistent palpitations and she was accepted by the hospitalist labs showed no leukocytosis, mildly elevated creatinine at 1.2 compared to baseline of 0.9 several months ago  Diagnosis:    ICD-10-CM    1. Atrial fibrillation with RVR (H)  I48.91            Discharge Medications:  New Prescriptions    No medications on file          <<<<<Critical care time greater than 30 minutes>>>>         Nati Orr MD  09/09/24 3012

## 2024-09-10 VITALS
OXYGEN SATURATION: 98 % | HEART RATE: 93 BPM | BODY MASS INDEX: 22.71 KG/M2 | WEIGHT: 133 LBS | SYSTOLIC BLOOD PRESSURE: 141 MMHG | RESPIRATION RATE: 16 BRPM | TEMPERATURE: 97.8 F | DIASTOLIC BLOOD PRESSURE: 89 MMHG | HEIGHT: 64 IN

## 2024-09-10 LAB
ANION GAP SERPL CALCULATED.3IONS-SCNC: 10 MMOL/L (ref 7–15)
ATRIAL RATE - MUSE: 74 BPM
ATRIAL RATE - MUSE: NORMAL BPM
ATRIAL RATE - MUSE: NORMAL BPM
BUN SERPL-MCNC: 18.9 MG/DL (ref 8–23)
CALCIUM SERPL-MCNC: 9 MG/DL (ref 8.8–10.4)
CHLORIDE SERPL-SCNC: 100 MMOL/L (ref 98–107)
CREAT SERPL-MCNC: 0.9 MG/DL (ref 0.51–0.95)
DIASTOLIC BLOOD PRESSURE - MUSE: NORMAL MMHG
EGFRCR SERPLBLD CKD-EPI 2021: 64 ML/MIN/1.73M2
ERYTHROCYTE [DISTWIDTH] IN BLOOD BY AUTOMATED COUNT: 15.3 % (ref 10–15)
GLUCOSE SERPL-MCNC: 112 MG/DL (ref 70–99)
HCO3 SERPL-SCNC: 25 MMOL/L (ref 22–29)
HCT VFR BLD AUTO: 36.2 % (ref 35–47)
HGB BLD-MCNC: 11.8 G/DL (ref 11.7–15.7)
INTERPRETATION ECG - MUSE: NORMAL
MAGNESIUM SERPL-MCNC: 2.2 MG/DL (ref 1.7–2.3)
MCH RBC QN AUTO: 31.6 PG (ref 26.5–33)
MCHC RBC AUTO-ENTMCNC: 32.6 G/DL (ref 31.5–36.5)
MCV RBC AUTO: 97 FL (ref 78–100)
P AXIS - MUSE: 83 DEGREES
P AXIS - MUSE: NORMAL DEGREES
P AXIS - MUSE: NORMAL DEGREES
PLATELET # BLD AUTO: 299 10E3/UL (ref 150–450)
POTASSIUM SERPL-SCNC: 3.8 MMOL/L (ref 3.4–5.3)
PR INTERVAL - MUSE: 192 MS
PR INTERVAL - MUSE: NORMAL MS
PR INTERVAL - MUSE: NORMAL MS
QRS DURATION - MUSE: 84 MS
QRS DURATION - MUSE: 86 MS
QRS DURATION - MUSE: 94 MS
QT - MUSE: 286 MS
QT - MUSE: 460 MS
QT - MUSE: 474 MS
QTC - MUSE: 459 MS
QTC - MUSE: 489 MS
QTC - MUSE: 526 MS
R AXIS - MUSE: -17 DEGREES
R AXIS - MUSE: -36 DEGREES
R AXIS - MUSE: 93 DEGREES
RBC # BLD AUTO: 3.73 10E6/UL (ref 3.8–5.2)
SODIUM SERPL-SCNC: 135 MMOL/L (ref 135–145)
SYSTOLIC BLOOD PRESSURE - MUSE: NORMAL MMHG
T AXIS - MUSE: -17 DEGREES
T AXIS - MUSE: -32 DEGREES
T AXIS - MUSE: 26 DEGREES
VENTRICULAR RATE- MUSE: 155 BPM
VENTRICULAR RATE- MUSE: 68 BPM
VENTRICULAR RATE- MUSE: 74 BPM
WBC # BLD AUTO: 7.2 10E3/UL (ref 4–11)

## 2024-09-10 PROCEDURE — 80048 BASIC METABOLIC PNL TOTAL CA: CPT | Performed by: INTERNAL MEDICINE

## 2024-09-10 PROCEDURE — 36415 COLL VENOUS BLD VENIPUNCTURE: CPT | Performed by: INTERNAL MEDICINE

## 2024-09-10 PROCEDURE — 99239 HOSP IP/OBS DSCHRG MGMT >30: CPT | Performed by: STUDENT IN AN ORGANIZED HEALTH CARE EDUCATION/TRAINING PROGRAM

## 2024-09-10 PROCEDURE — 250N000013 HC RX MED GY IP 250 OP 250 PS 637: Performed by: INTERNAL MEDICINE

## 2024-09-10 PROCEDURE — 85041 AUTOMATED RBC COUNT: CPT | Performed by: INTERNAL MEDICINE

## 2024-09-10 RX ORDER — BUMETANIDE 1 MG/1
1 TABLET ORAL DAILY
COMMUNITY
End: 2024-09-13

## 2024-09-10 RX ORDER — ERGOCALCIFEROL 1.25 MG/1
50000 CAPSULE, LIQUID FILLED ORAL WEEKLY
COMMUNITY

## 2024-09-10 RX ORDER — ALLOPURINOL 300 MG/1
300 TABLET ORAL DAILY
COMMUNITY

## 2024-09-10 RX ORDER — MULTIPLE VITAMINS W/ MINERALS TAB 9MG-400MCG
1 TAB ORAL DAILY
COMMUNITY

## 2024-09-10 RX ORDER — FINASTERIDE 5 MG/1
5 TABLET, FILM COATED ORAL DAILY
COMMUNITY

## 2024-09-10 RX ORDER — SPIRONOLACTONE 25 MG/1
25 TABLET ORAL DAILY
COMMUNITY

## 2024-09-10 RX ORDER — DAPAGLIFLOZIN 10 MG/1
10 TABLET, FILM COATED ORAL DAILY
COMMUNITY

## 2024-09-10 RX ORDER — METOPROLOL SUCCINATE 100 MG/1
200 TABLET, EXTENDED RELEASE ORAL DAILY
Qty: 30 TABLET | Refills: 0 | Status: ON HOLD | OUTPATIENT
Start: 2024-09-11 | End: 2024-09-13

## 2024-09-10 RX ORDER — METOPROLOL SUCCINATE 50 MG/1
50 TABLET, EXTENDED RELEASE ORAL DAILY
COMMUNITY
End: 2024-09-10

## 2024-09-10 RX ADMIN — APIXABAN 5 MG: 5 TABLET, FILM COATED ORAL at 00:44

## 2024-09-10 RX ADMIN — METOPROLOL SUCCINATE 100 MG: 50 TABLET, EXTENDED RELEASE ORAL at 07:51

## 2024-09-10 RX ADMIN — APIXABAN 5 MG: 5 TABLET, FILM COATED ORAL at 07:52

## 2024-09-10 RX ADMIN — METOPROLOL SUCCINATE 100 MG: 50 TABLET, EXTENDED RELEASE ORAL at 00:44

## 2024-09-10 ASSESSMENT — ACTIVITIES OF DAILY LIVING (ADL)
ADLS_ACUITY_SCORE: 35
ADLS_ACUITY_SCORE: 36
ADLS_ACUITY_SCORE: 36
ADLS_ACUITY_SCORE: 35
ADLS_ACUITY_SCORE: 35
ADLS_ACUITY_SCORE: 36
ADLS_ACUITY_SCORE: 36
ADLS_ACUITY_SCORE: 35
ADLS_ACUITY_SCORE: 35
ADLS_ACUITY_SCORE: 36
ADLS_ACUITY_SCORE: 36
ADLS_ACUITY_SCORE: 35
ADLS_ACUITY_SCORE: 35

## 2024-09-10 NOTE — H&P
[facility]       Hospitalist History & Physical     Assessment & Plan     ASSESSMENT    83F with history of permanent atrial fibrillation on Eliquis, HFpEF, severe tricuspid regurgitation, and untreated colon cancer presents with fatigue and tachycardia and found to have A-fib with RVR in setting of recent changes to her rate control medications.  Workup and treatment per below.    PLAN    Permanent atrial fibrillation with RVR  -Presents with A-fib with RVR in setting of recent changes to rate control medication  -Was on Toprol 200 mg daily but was experiencing exertional fatigue  -Appears this was changed to Coreg 25 mg twice daily but then back to metoprolol at cardiology visit 09/05  -Per chart review, change was not made it to her AVS and patient continues to take carvedilol instead of metoprolol  -Cardioversion attempted in ED although patient has permanent A-fib so cardioversion attempts will be futile  PLAN  -Start 100 mg Toprol tonight, titrate up to effect (was on 200mg Toprol every day prior so may need to do bid)  -Continue diltiazem infusion, wean as able  -Check magnesium  -Continue home Eliquis  -Cardiology consultation    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  -Was taking 2mg Bumex by mistake (per chart review is only supposed to be on 1 mg)  -Hold Bumex and spironolactone for tonight, can resume 1 mg in the morning if creatinine stable    Other Issues  -Chronic HFpEF: Hold Bumex for tonight given EDWARD  -Severe tricuspid regurgitation: Has surgery scheduled for repair later this year  -Metastatic colon cancer: Has chosen against treatment for this per chart review    DVT Prophy  -Home Eliquis    Disposition  -[facility]      Ross Matthew MD    History of Present Illness     Kristina Ramirez is a 83 year old with history of permanent atrial fibrillation on Eliquis, HFpEF, severe tricuspid regurgitation, and untreated colon cancer presents with fatigue and tachycardia.  Patient says over the last 24 hours,  has felt severely fatigued with any exertion.  Denies actual shortness of breath or chest pain.  No lower extremity edema.  Took her pulse and it was 170s.  History of permanent A-fib that she follows with cardiology at [facility] for.  Recently had changes to her rate control medication.  Was on Toprol 200 mg daily but was experiencing exertional fatigue.  Appears this was changed to Coreg 25 mg twice daily but then back to metoprolol at cardiology visit 09/05.  Per chart review, does not appear that this change made it to her after visit summary and patient continues to take carvedilol instead of metoprolol.  In the ED, HR 120s A-fib with RVR.  Other VSS.  Cardioversion attempted 3 times but unsuccessful so placed on diltiazem drip.  Admitted to medicine.    Review of Systems     A Comprehensive greater than 10 system review of systems was carried out.  Pertinent positives and negatives are noted above.  Otherwise negative for contributory information.     Past Medical History     Past Medical History:   Diagnosis Date    A-fib (H)     Colon cancer (H)      Medications     allopurinoL (ZYLOPRIM) 300 mg tablet Take 1 Tablet (300 mg) by mouth once daily. 90 Tablet 3   apixaban (Eliquis) 5 mg tablet Take 1 Tablet (5 mg) by mouth two times daily. (Patient taking differently: Take 5 mg by mouth two times daily. Marta was instructed to stop taking for now due to bleeding 3/25/2024 per Mayelin Anderson) 180 Tablet 3   biotin 1 mg cap Take 1,000 mcg by mouth.   bumetanide (BUMEX) 1 mg tablet Take 1 Tablet (1 mg) by mouth every morning. 90 Tablet 3   carvediloL (COREG) 12.5 mg tablet Take 1 Tablet (12.5 mg) by mouth two times daily with meals. NOTE: dose decreased from 25 to 12.5 mg TWICE A DAY. Patient to call when additional quantity is needed 180 Tablet 1   cyanocobalamin (VITAMIN B12) 500 mcg tablet Take 1 Tablet (500 mcg) by mouth once daily. 0   ergocalciferol (VITAMIN D2; DRISDOL) 50,000 unit capsule TAKE 1 CAPSULE  "(50,000 UNITS) BY MOUTH ONCE WEEKLY. 12 Capsule 3   Farxiga 10 mg tablet Take 1 Tablet (10 mg) by mouth once daily. 30 Tablet 11   finasteride (PROSCAR) 5 mg tablet Begin with 1/2 tablet by mouth once daily for one week. Following, take one tablet daily for alopecia.*   medication order composer Vitamin E 0   multivitamin capsule Take 1 Capsule by mouth once daily.   psyllium powd Mix 3 tsp in liquid then take by mouth once daily. 0   spironolactone (ALDACTONE) 25 mg tablet Take 1 Tablet (25 mg) by mouth once daily. 90 Tablet 0      Past Surgical History   History reviewed. No pertinent surgical history.     Family History   History reviewed. No pertinent family history.    Allergies     Allergies   Allergen Reactions    Codeine Other (See Comments)     Does not like    Hydrocodone-Acetaminophen Other (See Comments)     Does not like    Oxycodone-Acetaminophen Other (See Comments)     Does not like    Venlafaxine Other (See Comments)     Social History     Social History     Tobacco Use    Smoking status: Never    Smokeless tobacco: Not on file   Substance Use Topics    Alcohol use: Never     Physical Exam   Blood pressure (!) 157/97, pulse 111, temperature 97.5  F (36.4  C), temperature source Temporal, resp. rate 23, height 1.613 m (5' 3.5\"), weight 60.3 kg (133 lb), SpO2 96%.    General: Ill appearing, cooperative with exam, in NAD.  HEENT: Atraumatic. No erythema in posterior pharynx.  Lymph: No cervical or inguinal lymphadenopathy.  Cardiac: Tachycardic and regular. No murmurs.  Lungs: CTAB. Nl WOB.  Abd: Non-tender. No rebound or gaurding. Nl bowel sounds.  Ext: No edema. 2+ pulses.  Skin: No rashes, abrasions, or contusions.  Psych: A&Ox3. Nl affect.  Neuro: 5/5 strength. Sensation intact.    Labs & Imaging     Reviewed and Pertinent results discussed in assessment and plan.      "

## 2024-09-10 NOTE — DISCHARGE SUMMARY
[facility]  Hospitalist Discharge Summary      Date of Admission:  9/9/2024  Date of Discharge:  9/10/2024  1:14 PM  Discharging Provider: Shea Meneses DO  Discharge Service: Hospitalist Service    Discharge Diagnoses   Permanent Afib with RVR  EWDARD, resolved, on CKD2  Chronic HFpEF  Severe tricuspid regurgitation  Metastatic colon cancer    Clinically Significant Risk Factors          Follow-ups Needed After Discharge   Follow-up Appointments     Follow-up and recommended labs and tests       Follow up with primary care provider, Mayelin Anderson, within 7 days   for hospital follow- up.            Discharge Disposition   Discharged to home  Condition at discharge: Stable    Hospital Course   Kristina Ramirez is a 83 year old female with PMH of permanent Afib on eliquis, HFpEF, severe tricuspid regurgitation, and colon cancer, who was admitted to observation on 9/9/2024 with Afib with RVR. Had recent adjustment from coreg to metoprolol 50mg. Previously had been on 200mg metoprolol. Cardioversion attempted but unsuccessful in ER. RVR resolved with diltiazem infusion and resumption of her metoprolol at 200mg. Continued this on discharge and instructed patient to follow up with her cardiologist.      Consultations This Hospital Stay   CARDIOLOGY IP CONSULT  CARDIOLOGY IP CONSULT    Code Status   Prior    Time Spent on this Encounter   I, Shea Meneses DO, personally saw the patient today and spent greater than 30 minutes discharging this patient.       DO [facility] EMERGENCY DEPT  [address] [facility] [address]  Phone: [phone]  Fax: [phone]  ______________________________________________________________________    Physical Exam   Vital Signs: Temp: 97.8  F (36.6  C) Temp src: Oral BP: (!) 141/89 Pulse: 93   Resp: 16 SpO2: 98 % O2 Device: None (Room air) Oxygen Delivery: 3 LPM  Weight: 133 lbs 0 oz  Constitutional:  Awake, alert, no distress, and cooperative  Cardiovascular: regular rate and irregular rhythm, normal S1 and S2, no S3 or S4, and no murmur noted  Respiratory: No increased work of breathing, good air exchange, clear to auscultation bilaterally, no crackles or wheezing  Gastrointestinal: Abdomen soft, non-tender, non-distended. BS normal. No masses, organomegaly        Primary Care Physician   Mayelin Anderson    Discharge Orders      Reason for your hospital stay    You were in the hospital for atrial fibrillation with a fast heart rate. It was likely due to a recent change in your metoprolol dose. You should resume your previous dose of 200mg daily (starting tomorrow 9/11) and follow up with your cardiologist. Continue your other medications at your regular doses.     Follow-up and recommended labs and tests     Follow up with primary care provider, Mayelin Anderson, within 7 days for hospital follow- up.     Activity    Your activity upon discharge: activity as tolerated     Diet    Follow this diet upon discharge:       Regular Diet Adult       Significant Results and Procedures   Most Recent 3 CBC's:  Recent Labs   Lab Test 09/10/24  0904 09/09/24  1841 03/23/24  1242   WBC 7.2 10.5 7.7   HGB 11.8 13.3 13.2   MCV 97 97 93    383 257     Most Recent 3 BMP's:  Recent Labs   Lab Test 09/10/24  0904 09/09/24  1841 03/23/24  1242    136 140   POTASSIUM 3.8 3.9 3.5   CHLORIDE 100 95* 101   CO2 25 27 26   BUN 18.9 28.6* 23.6*   CR 0.90 1.28* 0.91   ANIONGAP 10 14 13   AGAPITO 9.0 10.2 9.8   * 166* 146*   ,   Results for orders placed or performed during the hospital encounter of 09/09/24   -Cardioversion External    Narrative    Nati Orr MD     9/9/2024 10:34 PM  [facility]    -Cardioversion External    Date/Time: 9/9/2024 10:27 PM    Performed by: Nati Orr MD  Authorized by: Nati Orr MD    Risks, benefits and alternatives discussed.    -Cardioversion External    Narrative    Nati Orr MD     9/9/2024 10:34 PM  [facility]    -Cardioversion External    Date/Time: 9/9/2024 10:28 PM    Performed by: Nati Orr MD  Authorized by: Nati Orr MD    Risks, benefits and alternatives discussed.    ED EVALUATION:      Assessment Time: 9/9/2024 9:34 PM      I have performed an Emergency Department Evaluation including taking a   history and physical examination, this evaluation will be documented in   the electronic medical record for this ED encounter.      ASA Class: Class 2- mild systemic disease, no acute problems, no   functional limitations    Mallampati: Grade 2- soft palate, base of uvula, tonsillar pillars, and   portion of posterior pharyngeal wall visible    UNIVERSAL PROTOCOL   Site Marked: Yes  Prior Images Obtained and Reviewed:  Yes  Required items: Required blood products, implants, devices and special   equipment available    Patient identity confirmed:  Arm band and verbally with patient  Patient was reevaluated immediately before administering moderate or deep   sedation or anesthesia  Confirmation Checklist:  Patient's identity using two indicators, relevant   allergies, procedure was appropriate and matched the consent or emergent   situation and correct equipment/implants were available  Time out: Immediately prior to the procedure a time out was called    Universal Protocol: the Joint Commission Universal Protocol was followed    Preparation: Patient was prepped and draped in usual sterile fashion      SEDATION  Patient Sedated: Yes    Sedation:  Propofol  Vital signs: Vital signs monitored during sedation      PRE-PROCEDURE DETAILS:     Cardioversion basis:  Elective    Rhythm:  Atrial fibrillation    Electrode placement:  Anterior-posterior  Attempt one:     Cardioversion mode:  Synchronous    Shock (Joules):  200    Shock outcome:  No change in rhythm  Attempt two:     Cardioversion  mode:  Synchronous    Shock (Joules):  200    Shock outcome:  No change in rhythm  Attempt three:     Cardioversion mode:  Synchronous    Shock (Joules):  200    Shock outcome:  No change in rhythm  Post-procedure details:     Patient status:  Awake      PROCEDURE    Patient Tolerance:  Patient tolerated the procedure well with no immediate   complications  Length of time physician/provider present for 1:1 monitoring during   sedation: 15       Discharge Medications   Discharge Medication List as of 9/10/2024 12:57 PM        CONTINUE these medications which have CHANGED    Details   metoprolol succinate ER (TOPROL XL) 100 MG 24 hr tablet Take 2 tablets (200 mg) by mouth daily., Disp-30 tablet, R-0, E-Prescribe           CONTINUE these medications which have NOT CHANGED    Details   allopurinol (ZYLOPRIM) 300 MG tablet Take 300 mg by mouth daily., Historical      apixaban ANTICOAGULANT (ELIQUIS) 5 MG tablet Take 5 mg by mouth 2 times daily., Historical      BIOTIN PO Take 1 tablet by mouth daily., Historical      bumetanide (BUMEX) 1 MG tablet Take 1 mg by mouth daily., Historical      dapagliflozin (FARXIGA) 10 MG TABS tablet Take 10 mg by mouth daily., 10 mg, Oral, DAILY, Historical      finasteride (PROSCAR) 5 MG tablet Take 5 mg by mouth daily., Historical      multivitamin w/minerals (THERA-VIT-M) tablet Take 1 tablet by mouth daily., Historical      psyllium (METAMUCIL/KONSYL) 58.6 % powder Take 1 Tablespoonful by mouth daily., Historical      spironolactone (ALDACTONE) 25 MG tablet Take 25 mg by mouth daily., Historical      vitamin D2 (ERGOCALCIFEROL) 17789 units (1250 mcg) capsule Take 50,000 Units by mouth once a week. On Wednesdays, Historical           Allergies   Allergies   Allergen Reactions    Codeine Other (See Comments)     Does not like    Hydrocodone-Acetaminophen Other (See Comments)     Does not like    Oxycodone-Acetaminophen Other (See Comments)     Does not like    Venlafaxine Other (See  Comments)

## 2024-09-10 NOTE — PROGRESS NOTES
An ETCO2 monitor was placed on the pt with 2LPM bled in. The Ambu bag, suction, and airways were setup and present in the room, but not needed. Pt was able to maintain airway throughout the procedure with no intervention needed. ETCO2 levels were maintained between 38-42.    RT Rosy on 9/9/2024 at 9:29 PM

## 2024-09-10 NOTE — SEDATION DOCUMENTATION
Patient tolerated procedure well, alert and orientated, family at bedside, cardioversion unsuccessful x3, update given to primary RN

## 2024-09-10 NOTE — PLAN OF CARE
Afib with RVR already resolved. Will cancel cards consult. Can likely discharge today.    Ross Matthew MD

## 2024-09-10 NOTE — ED NOTES
Rt AC IV removed due to infiltration, no blood return. Pt denied pain to the IV site. Pt's HR maintains 80-90 with 10ml/hr of diltiazem, titrated down to 5ml/hr. Pt is resting comfortable, has no complains at the moment, VSS

## 2024-09-10 NOTE — ED NOTES
Pt's HR has maintained between 60-100s on 5mg/hr of Diltiazem drip for the passed 2-3 hr. Consulted Dr. Matthew, will proceed to discontinue dilt drip for now. Writer is closely watching pt's HR over continuous monitor

## 2024-09-10 NOTE — PHARMACY-ADMISSION MEDICATION HISTORY
Pharmacist Admission Medication History    Admission medication history is complete. The information provided in this note is only as accurate as the sources available at the time of the update.    Information Source(s): Patient and CareEverywhere/SureScripts via in-person    Pertinent Information:      Changes made to PTA medication list:  Added: All  Deleted: None  Changed: None    Allergies reviewed with patient and updates made in EHR: yes    Medication History Completed By: Julia Millan PharmD 9/10/2024 10:04 AM    PTA Med List   Medication Sig Last Dose    allopurinol (ZYLOPRIM) 300 MG tablet Take 300 mg by mouth daily. 9/9/2024    apixaban ANTICOAGULANT (ELIQUIS) 5 MG tablet Take 5 mg by mouth 2 times daily. 9/9/2024    BIOTIN PO Take 1 tablet by mouth daily. 9/9/2024    bumetanide (BUMEX) 1 MG tablet Take 1 mg by mouth daily. 9/9/2024    dapagliflozin (FARXIGA) 10 MG TABS tablet Take 10 mg by mouth daily. 9/9/2024    finasteride (PROSCAR) 5 MG tablet Take 5 mg by mouth daily. 9/9/2024    metoprolol succinate ER (TOPROL XL) 50 MG 24 hr tablet Take 50 mg by mouth daily. 9/9/2024    multivitamin w/minerals (THERA-VIT-M) tablet Take 1 tablet by mouth daily. 9/9/2024    psyllium (METAMUCIL/KONSYL) 58.6 % powder Take 1 Tablespoonful by mouth daily. 9/9/2024    spironolactone (ALDACTONE) 25 MG tablet Take 25 mg by mouth daily. 9/9/2024    vitamin D2 (ERGOCALCIFEROL) 07843 units (1250 mcg) capsule Take 50,000 Units by mouth once a week. On Wednesdays 9/4/2024

## 2024-09-10 NOTE — ED NOTES
[facility]  ED Nurse Handoff Report    ED Chief complaint: Palpitations  . ED Diagnosis:   Final diagnoses:   Atrial fibrillation with RVR (H)       Allergies:   Allergies   Allergen Reactions    Codeine Other (See Comments)     Does not like    Hydrocodone-Acetaminophen Other (See Comments)     Does not like    Oxycodone-Acetaminophen Other (See Comments)     Does not like    Venlafaxine Other (See Comments)       Code Status: Full Code    Activity level - Baseline/Home:  independent.  Activity Level - Current:   independent.   Lift room needed: No.   Bariatric: No   Needed: No   Isolation: No.   Infection: Not Applicable.     Respiratory status: Room air    Vital Signs (within 30 minutes):   Vitals:    09/10/24 0240 09/10/24 0345 09/10/24 0400 09/10/24 0415   BP: (!) 133/93 (!) 129/100 125/78    Pulse: 89 86 81 75   Resp:       Temp:       TempSrc:       SpO2: 92% 93% 91% 92%   Weight:       Height:           Cardiac Rhythm:  ,      Pain level:    Patient confused: No.   Patient Falls Risk: activity supervised.   Elimination Status: Has voided     Patient Report - Initial Complaint: Rapid pulse and generalized feeling unwell .   Focused Assessment: Kristina Ramirez is a 83 year old female with history of atrial fibrillation on Eliquis, heart failure with preserved ejection fraction, and history of severe tricuspid regurgitation and metastatic colon cancer not on any treatment or chemotherapy who presents emergency department for evaluation of palpitations that began this morning at 8 AM.  She denies any chest pain, she reports some slight shortness of breath and dizziness.  Denies any fever, vomiting, diarrhea, cough or cold symptoms or recent infectious symptoms.  She reports that she was recently taken off Coreg and put back on metoprolol but only 50 mg.  She states that prior, she was on 200 mg/day.  She took an extra 50 mg this morning to see if that would help.  She also  is on Eliquis      Abnormal Results:   Labs Ordered and Resulted from Time of ED Arrival to Time of ED Departure   BASIC METABOLIC PANEL - Abnormal       Result Value    Sodium 136      Potassium 3.9      Chloride 95 (*)     Carbon Dioxide (CO2) 27      Anion Gap 14      Urea Nitrogen 28.6 (*)     Creatinine 1.28 (*)     GFR Estimate 42 (*)     Calcium 10.2      Glucose 166 (*)    CBC WITH PLATELETS AND DIFFERENTIAL - Abnormal    WBC Count 10.5      RBC Count 4.22      Hemoglobin 13.3      Hematocrit 40.9      MCV 97      MCH 31.5      MCHC 32.5      RDW 15.4 (*)     Platelet Count 383      % Neutrophils 68      % Lymphocytes 20      % Monocytes 9      % Eosinophils 3      % Basophils 1      % Immature Granulocytes 1      NRBCs per 100 WBC 0      Absolute Neutrophils 7.1      Absolute Lymphocytes 2.1      Absolute Monocytes 1.0      Absolute Eosinophils 0.3      Absolute Basophils 0.1      Absolute Immature Granulocytes 0.1      Absolute NRBCs 0.0     TROPONIN T, HIGH SENSITIVITY - Normal    Troponin T, High Sensitivity 12     MAGNESIUM - Normal    Magnesium 2.2          -Cardioversion External   Final Result      -Cardioversion External   Final Result          Treatments provided: SEE MAR  Family Comments:  and daughter   OBS brochure/video discussed/provided to patient:  N/A  ED Medications:   Medications   metoprolol (LOPRESSOR) injection 5 mg (5 mg Intravenous $Given 9/9/24 1937)   flumazenil (ROMAZICON) injection 0.2 mg (has no administration in time range)   propofol (DIPRIVAN) injection 10 mg/mL vial (15 mg Intravenous Not Given 9/9/24 2204)   diltiazem (CARDIZEM) 125 mg in dextrose 5 % 125 mL infusion (5 mg/hr Intravenous Rate/Dose Verify 9/10/24 8032)   senna-docusate (SENOKOT-S/PERICOLACE) 8.6-50 MG per tablet 1 tablet (has no administration in time range)     Or   senna-docusate (SENOKOT-S/PERICOLACE) 8.6-50 MG per tablet 2 tablet (has no administration in time range)   calcium carbonate (TUMS)  chewable tablet 1,000 mg (has no administration in time range)   acetaminophen (TYLENOL) tablet 650 mg (has no administration in time range)     Or   acetaminophen (TYLENOL) Suppository 650 mg (has no administration in time range)   ondansetron (ZOFRAN ODT) ODT tab 4 mg (has no administration in time range)     Or   ondansetron (ZOFRAN) injection 4 mg (has no administration in time range)   apixaban ANTICOAGULANT (ELIQUIS) tablet 5 mg (5 mg Oral $Given 9/10/24 0044)   metoprolol succinate ER (TOPROL XL) 24 hr tablet 100 mg (100 mg Oral $Given 9/10/24 0044)   bumetanide (BUMEX) tablet 1 mg ( Oral Automatically Held 9/13/24 0800)   sodium chloride 0.9% BOLUS 500 mL (0 mLs Intravenous Stopped 9/9/24 2004)   propofol (DIPRIVAN) injection 10 mg/mL vial (60 mg Intravenous $Given 9/9/24 2108)   metoprolol tartrate (LOPRESSOR) tablet 50 mg (50 mg Oral $Given 9/9/24 2207)       Drips infusing:  Yes - Diltiazem   For the majority of the shift this patient was Green.   Interventions performed were NA .    Sepsis treatment initiated: No    Cares/treatment/interventions/medications to be completed following ED care: NA     ED Nurse Name: Deepika Oh RN  4:16 AM

## 2024-09-11 ENCOUNTER — HOSPITAL ENCOUNTER (INPATIENT)
Facility: CLINIC | Age: 84
LOS: 2 days | Discharge: HOME | DRG: 309 | End: 2024-09-13
Attending: EMERGENCY MEDICINE | Admitting: INTERNAL MEDICINE
Payer: COMMERCIAL

## 2024-09-11 DIAGNOSIS — I50.22 CHRONIC SYSTOLIC CONGESTIVE HEART FAILURE (H): ICD-10-CM

## 2024-09-11 DIAGNOSIS — I48.91 ATRIAL FIBRILLATION WITH RAPID VENTRICULAR RESPONSE (H): Primary | ICD-10-CM

## 2024-09-11 DIAGNOSIS — I48.91 ATRIAL FIBRILLATION WITH RVR (H): ICD-10-CM

## 2024-09-11 LAB
ANION GAP SERPL CALCULATED.3IONS-SCNC: 15 MMOL/L (ref 7–15)
BASOPHILS # BLD AUTO: 0.1 10E3/UL (ref 0–0.2)
BASOPHILS NFR BLD AUTO: 0 %
BUN SERPL-MCNC: 20.8 MG/DL (ref 8–23)
CALCIUM SERPL-MCNC: 9.5 MG/DL (ref 8.8–10.4)
CHLORIDE SERPL-SCNC: 94 MMOL/L (ref 98–107)
CREAT SERPL-MCNC: 1.04 MG/DL (ref 0.51–0.95)
EGFRCR SERPLBLD CKD-EPI 2021: 53 ML/MIN/1.73M2
EOSINOPHIL # BLD AUTO: 0.1 10E3/UL (ref 0–0.7)
EOSINOPHIL NFR BLD AUTO: 1 %
ERYTHROCYTE [DISTWIDTH] IN BLOOD BY AUTOMATED COUNT: 15.3 % (ref 10–15)
GLUCOSE SERPL-MCNC: 115 MG/DL (ref 70–99)
HCO3 SERPL-SCNC: 24 MMOL/L (ref 22–29)
HCT VFR BLD AUTO: 40.4 % (ref 35–47)
HGB BLD-MCNC: 13.1 G/DL (ref 11.7–15.7)
HOLD SPECIMEN: NORMAL
IMM GRANULOCYTES # BLD: 0.1 10E3/UL
IMM GRANULOCYTES NFR BLD: 1 %
LACTATE SERPL-SCNC: 1.4 MMOL/L (ref 0.7–2)
LYMPHOCYTES # BLD AUTO: 1.2 10E3/UL (ref 0.8–5.3)
LYMPHOCYTES NFR BLD AUTO: 9 %
MAGNESIUM SERPL-MCNC: 1.8 MG/DL (ref 1.7–2.3)
MCH RBC QN AUTO: 31 PG (ref 26.5–33)
MCHC RBC AUTO-ENTMCNC: 32.4 G/DL (ref 31.5–36.5)
MCV RBC AUTO: 96 FL (ref 78–100)
MONOCYTES # BLD AUTO: 1 10E3/UL (ref 0–1.3)
MONOCYTES NFR BLD AUTO: 8 %
NEUTROPHILS # BLD AUTO: 10 10E3/UL (ref 1.6–8.3)
NEUTROPHILS NFR BLD AUTO: 81 %
NRBC # BLD AUTO: 0 10E3/UL
NRBC BLD AUTO-RTO: 0 /100
PLATELET # BLD AUTO: 358 10E3/UL (ref 150–450)
POTASSIUM SERPL-SCNC: 3.6 MMOL/L (ref 3.4–5.3)
RBC # BLD AUTO: 4.23 10E6/UL (ref 3.8–5.2)
SODIUM SERPL-SCNC: 133 MMOL/L (ref 135–145)
TSH SERPL DL<=0.005 MIU/L-ACNC: 3.26 UIU/ML (ref 0.3–4.2)
WBC # BLD AUTO: 12.4 10E3/UL (ref 4–11)

## 2024-09-11 PROCEDURE — 99285 EMERGENCY DEPT VISIT HI MDM: CPT | Mod: 25

## 2024-09-11 PROCEDURE — 36415 COLL VENOUS BLD VENIPUNCTURE: CPT | Performed by: INTERNAL MEDICINE

## 2024-09-11 PROCEDURE — 250N000013 HC RX MED GY IP 250 OP 250 PS 637: Performed by: INTERNAL MEDICINE

## 2024-09-11 PROCEDURE — 80048 BASIC METABOLIC PNL TOTAL CA: CPT | Performed by: EMERGENCY MEDICINE

## 2024-09-11 PROCEDURE — 93005 ELECTROCARDIOGRAM TRACING: CPT

## 2024-09-11 PROCEDURE — 99223 1ST HOSP IP/OBS HIGH 75: CPT | Performed by: INTERNAL MEDICINE

## 2024-09-11 PROCEDURE — 96376 TX/PRO/DX INJ SAME DRUG ADON: CPT

## 2024-09-11 PROCEDURE — 250N000011 HC RX IP 250 OP 636: Performed by: EMERGENCY MEDICINE

## 2024-09-11 PROCEDURE — 83605 ASSAY OF LACTIC ACID: CPT | Performed by: INTERNAL MEDICINE

## 2024-09-11 PROCEDURE — 120N000013 HC R&B IMCU

## 2024-09-11 PROCEDURE — 36415 COLL VENOUS BLD VENIPUNCTURE: CPT | Performed by: EMERGENCY MEDICINE

## 2024-09-11 PROCEDURE — 250N000009 HC RX 250: Performed by: EMERGENCY MEDICINE

## 2024-09-11 PROCEDURE — 96374 THER/PROPH/DIAG INJ IV PUSH: CPT

## 2024-09-11 PROCEDURE — 85025 COMPLETE CBC W/AUTO DIFF WBC: CPT | Performed by: EMERGENCY MEDICINE

## 2024-09-11 PROCEDURE — 83735 ASSAY OF MAGNESIUM: CPT | Performed by: EMERGENCY MEDICINE

## 2024-09-11 PROCEDURE — 258N000003 HC RX IP 258 OP 636: Performed by: EMERGENCY MEDICINE

## 2024-09-11 PROCEDURE — 84443 ASSAY THYROID STIM HORMONE: CPT | Performed by: EMERGENCY MEDICINE

## 2024-09-11 RX ORDER — DILTIAZEM HCL/D5W 125 MG/125
5-15 PLASTIC BAG, INJECTION (ML) INTRAVENOUS CONTINUOUS
Status: DISCONTINUED | OUTPATIENT
Start: 2024-09-11 | End: 2024-09-11

## 2024-09-11 RX ORDER — AMOXICILLIN 250 MG
2 CAPSULE ORAL 2 TIMES DAILY PRN
Status: DISCONTINUED | OUTPATIENT
Start: 2024-09-11 | End: 2024-09-13 | Stop reason: HOSPADM

## 2024-09-11 RX ORDER — ACETAMINOPHEN 325 MG/1
650 TABLET ORAL EVERY 4 HOURS PRN
Status: DISCONTINUED | OUTPATIENT
Start: 2024-09-11 | End: 2024-09-13 | Stop reason: HOSPADM

## 2024-09-11 RX ORDER — BISACODYL 10 MG
10 SUPPOSITORY, RECTAL RECTAL DAILY PRN
Status: DISCONTINUED | OUTPATIENT
Start: 2024-09-11 | End: 2024-09-13 | Stop reason: HOSPADM

## 2024-09-11 RX ORDER — PROCHLORPERAZINE MALEATE 5 MG
5 TABLET ORAL EVERY 6 HOURS PRN
Status: DISCONTINUED | OUTPATIENT
Start: 2024-09-11 | End: 2024-09-13 | Stop reason: HOSPADM

## 2024-09-11 RX ORDER — SPIRONOLACTONE 25 MG/1
25 TABLET ORAL DAILY
Status: DISCONTINUED | OUTPATIENT
Start: 2024-09-12 | End: 2024-09-13 | Stop reason: HOSPADM

## 2024-09-11 RX ORDER — AMOXICILLIN 250 MG
1 CAPSULE ORAL 2 TIMES DAILY PRN
Status: DISCONTINUED | OUTPATIENT
Start: 2024-09-11 | End: 2024-09-13 | Stop reason: HOSPADM

## 2024-09-11 RX ORDER — METOPROLOL TARTRATE 1 MG/ML
5 INJECTION, SOLUTION INTRAVENOUS EVERY 5 MIN PRN
Status: COMPLETED | OUTPATIENT
Start: 2024-09-11 | End: 2024-09-11

## 2024-09-11 RX ORDER — CALCIUM CARBONATE 500 MG/1
1000 TABLET, CHEWABLE ORAL 4 TIMES DAILY PRN
Status: DISCONTINUED | OUTPATIENT
Start: 2024-09-11 | End: 2024-09-13 | Stop reason: HOSPADM

## 2024-09-11 RX ORDER — DILTIAZEM HYDROCHLORIDE 5 MG/ML
20 INJECTION INTRAVENOUS ONCE
Status: COMPLETED | OUTPATIENT
Start: 2024-09-11 | End: 2024-09-11

## 2024-09-11 RX ORDER — DAPAGLIFLOZIN 5 MG/1
10 TABLET, FILM COATED ORAL DAILY
Status: DISCONTINUED | OUTPATIENT
Start: 2024-09-12 | End: 2024-09-11 | Stop reason: ALTCHOICE

## 2024-09-11 RX ORDER — ALLOPURINOL 300 MG/1
300 TABLET ORAL DAILY
Status: DISCONTINUED | OUTPATIENT
Start: 2024-09-12 | End: 2024-09-13 | Stop reason: HOSPADM

## 2024-09-11 RX ORDER — FINASTERIDE 5 MG/1
5 TABLET, FILM COATED ORAL DAILY
Status: DISCONTINUED | OUTPATIENT
Start: 2024-09-12 | End: 2024-09-13 | Stop reason: HOSPADM

## 2024-09-11 RX ORDER — POLYETHYLENE GLYCOL 3350 17 G/17G
17 POWDER, FOR SOLUTION ORAL 2 TIMES DAILY PRN
Status: DISCONTINUED | OUTPATIENT
Start: 2024-09-11 | End: 2024-09-13 | Stop reason: HOSPADM

## 2024-09-11 RX ORDER — METOPROLOL SUCCINATE 25 MG/1
100 TABLET, EXTENDED RELEASE ORAL 2 TIMES DAILY
Status: DISCONTINUED | OUTPATIENT
Start: 2024-09-11 | End: 2024-09-13 | Stop reason: HOSPADM

## 2024-09-11 RX ORDER — ONDANSETRON 2 MG/ML
4 INJECTION INTRAMUSCULAR; INTRAVENOUS EVERY 6 HOURS PRN
Status: DISCONTINUED | OUTPATIENT
Start: 2024-09-11 | End: 2024-09-13 | Stop reason: HOSPADM

## 2024-09-11 RX ORDER — PROCHLORPERAZINE 25 MG
12.5 SUPPOSITORY, RECTAL RECTAL EVERY 12 HOURS PRN
Status: DISCONTINUED | OUTPATIENT
Start: 2024-09-11 | End: 2024-09-13 | Stop reason: HOSPADM

## 2024-09-11 RX ORDER — ONDANSETRON 4 MG/1
4 TABLET, ORALLY DISINTEGRATING ORAL EVERY 6 HOURS PRN
Status: DISCONTINUED | OUTPATIENT
Start: 2024-09-11 | End: 2024-09-13 | Stop reason: HOSPADM

## 2024-09-11 RX ORDER — LIDOCAINE 40 MG/G
CREAM TOPICAL
Status: DISCONTINUED | OUTPATIENT
Start: 2024-09-11 | End: 2024-09-13 | Stop reason: HOSPADM

## 2024-09-11 RX ORDER — ACETAMINOPHEN 650 MG/1
650 SUPPOSITORY RECTAL EVERY 4 HOURS PRN
Status: DISCONTINUED | OUTPATIENT
Start: 2024-09-11 | End: 2024-09-13 | Stop reason: HOSPADM

## 2024-09-11 RX ORDER — BUMETANIDE 0.5 MG/1
1 TABLET ORAL DAILY
Status: DISCONTINUED | OUTPATIENT
Start: 2024-09-12 | End: 2024-09-12

## 2024-09-11 RX ORDER — DILTIAZEM HCL/D5W 125 MG/125
0-10 PLASTIC BAG, INJECTION (ML) INTRAVENOUS CONTINUOUS
Status: DISCONTINUED | OUTPATIENT
Start: 2024-09-11 | End: 2024-09-12

## 2024-09-11 RX ADMIN — METOPROLOL TARTRATE 5 MG: 5 INJECTION INTRAVENOUS at 17:26

## 2024-09-11 RX ADMIN — SODIUM CHLORIDE 1000 ML: 9 INJECTION, SOLUTION INTRAVENOUS at 17:26

## 2024-09-11 RX ADMIN — METOPROLOL TARTRATE 5 MG: 5 INJECTION INTRAVENOUS at 18:09

## 2024-09-11 RX ADMIN — APIXABAN 5 MG: 5 TABLET, FILM COATED ORAL at 22:33

## 2024-09-11 RX ADMIN — METOPROLOL SUCCINATE 100 MG: 25 TABLET, EXTENDED RELEASE ORAL at 22:33

## 2024-09-11 RX ADMIN — DILTIAZEM HYDROCHLORIDE 20 MG: 5 INJECTION INTRAVENOUS at 19:25

## 2024-09-11 RX ADMIN — METOPROLOL TARTRATE 5 MG: 5 INJECTION INTRAVENOUS at 18:27

## 2024-09-11 RX ADMIN — Medication 5 MG/HR: at 19:27

## 2024-09-11 ASSESSMENT — ACTIVITIES OF DAILY LIVING (ADL)
ADLS_ACUITY_SCORE: 23
ADLS_ACUITY_SCORE: 23
ADLS_ACUITY_SCORE: 36

## 2024-09-11 ASSESSMENT — COLUMBIA-SUICIDE SEVERITY RATING SCALE - C-SSRS
1. IN THE PAST MONTH, HAVE YOU WISHED YOU WERE DEAD OR WISHED YOU COULD GO TO SLEEP AND NOT WAKE UP?: NO
6. HAVE YOU EVER DONE ANYTHING, STARTED TO DO ANYTHING, OR PREPARED TO DO ANYTHING TO END YOUR LIFE?: NO
2. HAVE YOU ACTUALLY HAD ANY THOUGHTS OF KILLING YOURSELF IN THE PAST MONTH?: NO

## 2024-09-11 NOTE — ED TRIAGE NOTES
Pt states she was in the hospital yesterday for the same symptoms.  Pt comes in with elevated heart rate.  She states she was cardioverted X3 and was given IV medications.  She states the heart rate was ok until this afternoon when she noted her heart rate again was elevated.  She comes in for assessment.  She is currently wearing a halter monitor.       Triage Assessment (Adult)       Row Name 09/11/24 0163          Triage Assessment    Airway WDL WDL        Respiratory WDL    Respiratory WDL WDL        Cardiac WDL    Cardiac WDL X     Cardiac Rhythm Other (Comment)  TACHYCARDIA

## 2024-09-11 NOTE — ED PROVIDER NOTES
Emergency Department Note      History of Present Illness     Chief Complaint   Tachycardia      HPI   Kristina Ramirez is a 83 year old female anticoagulated on Eliquis with history of atrial fibrillation, anemia, hypertension, hyperlipidemia, stage 3a CKD, and terminal colon cancer who presents to the ED with her  for evaluation of tachycardia. Patient recently discharged with similar symptoms yesterday. She was cardioverted 3x in the ED unsuccessfully. Discharged with higher metoprolol dose. Kristina reports she felt very fatigued yesterday. Notes some chest pain last night. She then woke up today still feeling weak and generally unwell. States she does not feel the palpitations. She is compliant with her medications. Endorses dizziness and some shortness of breath on exertion. No fever, cough, nausea, vomiting, diarrhea, or urinary symptoms. Patient regularly follows up with her cardiologist. She has terminal colon cancer and underwent radiation therapy in April.          Independent Historian   None    Review of External Notes   I reviewed the discharge summary from yesterday.  Patient was admitted with A-fib and RVR.  They attempted electrical cardioversion x 3 in the ED without success.  Patient was eventually placed on a diltiazem drip and transition to metoprolol  mg daily on discharge.    Past Medical History     Medical History and Problem List   Atrial fibrillation with RVR  Colon cancer   Atrial enlargement  Hypertension  GERD  H. Pylori  Anemia  Colon polyps  Mitral valve insufficiency  Tricuspid valve insufficiency  Peptic ulcer disease  Sensorineural hearing loss, bilateral  Stage 3a CKD  Type 2 diabetes mellitus   Multiple sclerosis     Medications   Allopurinol  Eliquis  Bumetanide   Dapaglifolozin  Finasteride  Metoprolol succinate ER  Spironolactone     Surgical History   Colon resection, right   Wrist fracture surgery  Tonsillectomy   Esophagogastroduodenoscopy  Colonoscopy  "x3  Liver biopsy     Physical Exam     Patient Vitals for the past 24 hrs:   BP Temp Temp src Pulse Resp SpO2 Height Weight   09/11/24 1945 -- -- -- 104 -- 95 % -- --   09/11/24 1940 (!) 128/93 -- -- (!) 126 -- 96 % -- --   09/11/24 1920 (!) 135/107 -- -- (!) 140 -- 93 % -- --   09/11/24 1910 -- -- -- -- 19 -- -- --   09/11/24 1900 (!) 145/109 -- -- (!) 130 -- 97 % -- --   09/11/24 1844 -- -- -- (!) 137 28 96 % -- --   09/11/24 1840 (!) 142/64 -- -- 111 -- 94 % -- --   09/11/24 1830 (!) 144/90 -- -- (!) 134 30 94 % -- --   09/11/24 1827 (!) 144/90 -- -- (!) 124 -- -- -- --   09/11/24 1820 (!) 140/85 -- -- 120 26 96 % -- --   09/11/24 1811 -- -- -- -- 24 -- -- --   09/11/24 1810 (!) 145/97 -- -- (!) 130 26 94 % -- --   09/11/24 1809 (!) 145/97 -- -- (!) 124 -- -- -- --   09/11/24 1800 (!) 140/96 -- -- (!) 123 24 95 % -- --   09/11/24 1740 (!) 141/85 -- -- (!) 137 20 96 % -- --   09/11/24 1735 (!) 142/93 -- -- (!) 121 -- 97 % -- --   09/11/24 1730 (!) 145/94 -- -- (!) 125 -- 95 % -- --   09/11/24 1726 132/87 -- -- 118 -- -- -- --   09/11/24 1715 132/87 -- -- (!) 130 -- 97 % -- --   09/11/24 1630 (!) 139/102 -- -- (!) 134 28 97 % -- --   09/11/24 1618 (!) 140/100 98.4  F (36.9  C) Temporal (!) 160 17 98 % 1.613 m (5' 3.5\") 61.4 kg (135 lb 5.8 oz)     Physical Exam  Vitals and nursing note reviewed.   Constitutional:       General: She is not in acute distress.     Appearance: She is not ill-appearing.   HENT:      Head: Normocephalic and atraumatic.      Right Ear: External ear normal.      Left Ear: External ear normal.      Nose: Nose normal.      Mouth/Throat:      Mouth: Mucous membranes are moist.   Eyes:      Extraocular Movements: Extraocular movements intact.      Conjunctiva/sclera: Conjunctivae normal.   Cardiovascular:      Rate and Rhythm: Tachycardia present. Rhythm irregular.      Heart sounds: No murmur heard.  Pulmonary:      Effort: Pulmonary effort is normal. No respiratory distress.      Breath " sounds: Normal breath sounds. No wheezing, rhonchi or rales.   Abdominal:      General: Abdomen is flat. Bowel sounds are normal. There is no distension.      Palpations: Abdomen is soft.      Tenderness: There is no abdominal tenderness. There is no guarding or rebound.   Musculoskeletal:         General: No deformity or signs of injury.      Cervical back: Normal range of motion and neck supple.   Skin:     General: Skin is warm and dry.      Findings: No rash.   Neurological:      Mental Status: She is alert and oriented to person, place, and time.   Psychiatric:         Behavior: Behavior normal.           Diagnostics     Lab Results   Labs Ordered and Resulted from Time of ED Arrival to Time of ED Departure   BASIC METABOLIC PANEL - Abnormal       Result Value    Sodium 133 (*)     Potassium 3.6      Chloride 94 (*)     Carbon Dioxide (CO2) 24      Anion Gap 15      Urea Nitrogen 20.8      Creatinine 1.04 (*)     GFR Estimate 53 (*)     Calcium 9.5      Glucose 115 (*)    CBC WITH PLATELETS AND DIFFERENTIAL - Abnormal    WBC Count 12.4 (*)     RBC Count 4.23      Hemoglobin 13.1      Hematocrit 40.4      MCV 96      MCH 31.0      MCHC 32.4      RDW 15.3 (*)     Platelet Count 358      % Neutrophils 81      % Lymphocytes 9      % Monocytes 8      % Eosinophils 1      % Basophils 0      % Immature Granulocytes 1      NRBCs per 100 WBC 0      Absolute Neutrophils 10.0 (*)     Absolute Lymphocytes 1.2      Absolute Monocytes 1.0      Absolute Eosinophils 0.1      Absolute Basophils 0.1      Absolute Immature Granulocytes 0.1      Absolute NRBCs 0.0     MAGNESIUM - Normal    Magnesium 1.8     TSH WITH FREE T4 REFLEX - Normal    TSH 3.26         Imaging   No orders to display       EKG   ECG taken at 1627, ECG read at 1650  Atrial fibrillation with rapid ventricular response  Low voltage QRS  Septal infarct, age undetermined  Lateral infarct, age undetermined   Cannot rule out Inferior infarct, age  undetermined  Abnormal ECG   No significant change as compared to prior, dated 9/9/24.  Rate 129 bpm. NV interval * ms. QRS duration 80 ms. QT/QTc 310/454 ms. P-R-T axes * 73 180.    Independent Interpretation   None    ED Course      Medications Administered   Medications   diltiazem (CARDIZEM) 125 mg in dextrose 5 % 125 mL infusion (5 mg/hr Intravenous $New Bag 9/11/24 1927)   metoprolol (LOPRESSOR) injection 5 mg (5 mg Intravenous $Given 9/11/24 1827)   sodium chloride 0.9% BOLUS 1,000 mL (1,000 mLs Intravenous $New Bag 9/11/24 1726)   diltiazem (CARDIZEM) injection 20 mg (20 mg Intravenous $Given 9/11/24 1925)       Procedures   Procedures     Discussion of Management   Admitting Hospitalist, Kaveh Okeefe MD    ED Course   ED Course as of 09/11/24 1955   Wed Sep 11, 2024   1637 I obtained history and examined the patient as noted above.    1918 I discussed with Kaveh Okeefe MD who accepts admission.       Additional Documentation  None    Medical Decision Making / Diagnosis     CMS Diagnoses: None    MIPS       None    MDM   Kristina Ramirez is a 83 year old female who presents with general weakness and recurrent A-fib with RVR.  It is unclear why she has recurrent A-fib with RVR as she has been taking her metoprolol as prescribed.  She has no significant symptoms to suggest underlying ischemia.  Given the failed cardioversion attempts 2 days ago, we will hold off on any cardioversion attempts in the ED.  We did try to rate control her with metoprolol x 3.  This was unsuccessful so we transition to diltiazem bolus and infusion.  Discussed with the hospitalist who will admit for additional rate control.    Disposition   The patient was admitted to the hospital.     Diagnosis     ICD-10-CM    1. Atrial fibrillation with rapid ventricular response (H)  I48.91            Discharge Medications   New Prescriptions    No medications on file         Scribe Disclosure:  Shea TORO, am serving as a scribe at  4:42 PM on 9/11/2024 to document services personally performed by Yared Gaviria MD based on my observations and the provider's statements to me.        Yared Gaviria MD  09/11/24 2103

## 2024-09-12 ENCOUNTER — APPOINTMENT (OUTPATIENT)
Dept: CT IMAGING | Facility: CLINIC | Age: 84
DRG: 309 | End: 2024-09-12
Attending: HOSPITALIST
Payer: COMMERCIAL

## 2024-09-12 ENCOUNTER — APPOINTMENT (OUTPATIENT)
Dept: CARDIOLOGY | Facility: CLINIC | Age: 84
DRG: 309 | End: 2024-09-12
Attending: INTERNAL MEDICINE
Payer: COMMERCIAL

## 2024-09-12 ENCOUNTER — APPOINTMENT (OUTPATIENT)
Dept: ULTRASOUND IMAGING | Facility: CLINIC | Age: 84
DRG: 309 | End: 2024-09-12
Attending: HOSPITALIST
Payer: COMMERCIAL

## 2024-09-12 LAB
ALBUMIN UR-MCNC: NEGATIVE MG/DL
ANION GAP SERPL CALCULATED.3IONS-SCNC: 14 MMOL/L (ref 7–15)
APPEARANCE UR: CLEAR
ATRIAL RATE - MUSE: NORMAL BPM
BILIRUB UR QL STRIP: NEGATIVE
BUN SERPL-MCNC: 16 MG/DL (ref 8–23)
CALCIUM SERPL-MCNC: 8.7 MG/DL (ref 8.8–10.4)
CHLORIDE SERPL-SCNC: 98 MMOL/L (ref 98–107)
COLOR UR AUTO: ABNORMAL
CREAT SERPL-MCNC: 0.89 MG/DL (ref 0.51–0.95)
DIASTOLIC BLOOD PRESSURE - MUSE: NORMAL MMHG
EGFRCR SERPLBLD CKD-EPI 2021: 64 ML/MIN/1.73M2
ERYTHROCYTE [DISTWIDTH] IN BLOOD BY AUTOMATED COUNT: 15.5 % (ref 10–15)
ERYTHROCYTE [SEDIMENTATION RATE] IN BLOOD BY WESTERGREN METHOD: 43 MM/HR (ref 0–30)
GLUCOSE SERPL-MCNC: 130 MG/DL (ref 70–99)
GLUCOSE UR STRIP-MCNC: 300 MG/DL
HCO3 SERPL-SCNC: 22 MMOL/L (ref 22–29)
HCT VFR BLD AUTO: 34.5 % (ref 35–47)
HGB BLD-MCNC: 11.4 G/DL (ref 11.7–15.7)
HGB UR QL STRIP: NEGATIVE
HYALINE CASTS: 1 /LPF
INTERPRETATION ECG - MUSE: NORMAL
KETONES UR STRIP-MCNC: NEGATIVE MG/DL
LEUKOCYTE ESTERASE UR QL STRIP: NEGATIVE
LVEF ECHO: NORMAL
MAGNESIUM SERPL-MCNC: 1.9 MG/DL (ref 1.7–2.3)
MCH RBC QN AUTO: 31.2 PG (ref 26.5–33)
MCHC RBC AUTO-ENTMCNC: 33 G/DL (ref 31.5–36.5)
MCV RBC AUTO: 95 FL (ref 78–100)
MUCOUS THREADS #/AREA URNS LPF: PRESENT /LPF
NITRATE UR QL: NEGATIVE
P AXIS - MUSE: NORMAL DEGREES
PH UR STRIP: 5.5 [PH] (ref 5–7)
PLATELET # BLD AUTO: 275 10E3/UL (ref 150–450)
POTASSIUM SERPL-SCNC: 3.6 MMOL/L (ref 3.4–5.3)
PR INTERVAL - MUSE: NORMAL MS
PROCALCITONIN SERPL IA-MCNC: 0.87 NG/ML
QRS DURATION - MUSE: 80 MS
QT - MUSE: 310 MS
QTC - MUSE: 454 MS
R AXIS - MUSE: 73 DEGREES
RBC # BLD AUTO: 3.65 10E6/UL (ref 3.8–5.2)
RBC URINE: 1 /HPF
SODIUM SERPL-SCNC: 134 MMOL/L (ref 135–145)
SP GR UR STRIP: 1.01 (ref 1–1.03)
SYSTOLIC BLOOD PRESSURE - MUSE: NORMAL MMHG
T AXIS - MUSE: 180 DEGREES
UROBILINOGEN UR STRIP-MCNC: NORMAL MG/DL
VENTRICULAR RATE- MUSE: 129 BPM
WBC # BLD AUTO: 14.8 10E3/UL (ref 4–11)
WBC URINE: 3 /HPF

## 2024-09-12 PROCEDURE — 85048 AUTOMATED LEUKOCYTE COUNT: CPT | Performed by: INTERNAL MEDICINE

## 2024-09-12 PROCEDURE — 93308 TTE F-UP OR LMTD: CPT | Mod: 26 | Performed by: INTERNAL MEDICINE

## 2024-09-12 PROCEDURE — 120N000001 HC R&B MED SURG/OB

## 2024-09-12 PROCEDURE — 93325 DOPPLER ECHO COLOR FLOW MAPG: CPT

## 2024-09-12 PROCEDURE — 250N000013 HC RX MED GY IP 250 OP 250 PS 637: Performed by: INTERNAL MEDICINE

## 2024-09-12 PROCEDURE — 80048 BASIC METABOLIC PNL TOTAL CA: CPT | Performed by: INTERNAL MEDICINE

## 2024-09-12 PROCEDURE — 36415 COLL VENOUS BLD VENIPUNCTURE: CPT | Performed by: INTERNAL MEDICINE

## 2024-09-12 PROCEDURE — 76705 ECHO EXAM OF ABDOMEN: CPT

## 2024-09-12 PROCEDURE — 36415 COLL VENOUS BLD VENIPUNCTURE: CPT | Performed by: HOSPITALIST

## 2024-09-12 PROCEDURE — G0378 HOSPITAL OBSERVATION PER HR: HCPCS

## 2024-09-12 PROCEDURE — 83735 ASSAY OF MAGNESIUM: CPT | Performed by: INTERNAL MEDICINE

## 2024-09-12 PROCEDURE — 255N000002 HC RX 255 OP 636: Performed by: INTERNAL MEDICINE

## 2024-09-12 PROCEDURE — 85652 RBC SED RATE AUTOMATED: CPT | Performed by: HOSPITALIST

## 2024-09-12 PROCEDURE — 93321 DOPPLER ECHO F-UP/LMTD STD: CPT | Mod: 26 | Performed by: INTERNAL MEDICINE

## 2024-09-12 PROCEDURE — 99233 SBSQ HOSP IP/OBS HIGH 50: CPT | Performed by: HOSPITALIST

## 2024-09-12 PROCEDURE — 81001 URINALYSIS AUTO W/SCOPE: CPT | Performed by: HOSPITALIST

## 2024-09-12 PROCEDURE — 99223 1ST HOSP IP/OBS HIGH 75: CPT | Performed by: INTERNAL MEDICINE

## 2024-09-12 PROCEDURE — 71260 CT THORAX DX C+: CPT

## 2024-09-12 PROCEDURE — 258N000003 HC RX IP 258 OP 636: Performed by: INTERNAL MEDICINE

## 2024-09-12 PROCEDURE — 87040 BLOOD CULTURE FOR BACTERIA: CPT | Performed by: HOSPITALIST

## 2024-09-12 PROCEDURE — 250N000011 HC RX IP 250 OP 636: Performed by: HOSPITALIST

## 2024-09-12 PROCEDURE — 250N000011 HC RX IP 250 OP 636: Performed by: INTERNAL MEDICINE

## 2024-09-12 PROCEDURE — 250N000009 HC RX 250: Performed by: HOSPITALIST

## 2024-09-12 PROCEDURE — 250N000013 HC RX MED GY IP 250 OP 250 PS 637: Performed by: HOSPITALIST

## 2024-09-12 PROCEDURE — 250N000013 HC RX MED GY IP 250 OP 250 PS 637: Performed by: PHYSICIAN ASSISTANT

## 2024-09-12 PROCEDURE — 93325 DOPPLER ECHO COLOR FLOW MAPG: CPT | Mod: 26 | Performed by: INTERNAL MEDICINE

## 2024-09-12 PROCEDURE — 84145 PROCALCITONIN (PCT): CPT | Performed by: HOSPITALIST

## 2024-09-12 RX ORDER — BUMETANIDE 0.5 MG/1
0.5 TABLET ORAL DAILY
Status: DISCONTINUED | OUTPATIENT
Start: 2024-09-13 | End: 2024-09-13 | Stop reason: HOSPADM

## 2024-09-12 RX ORDER — MAGNESIUM OXIDE 400 MG/1
400 TABLET ORAL EVERY 4 HOURS
Status: COMPLETED | OUTPATIENT
Start: 2024-09-12 | End: 2024-09-12

## 2024-09-12 RX ORDER — PIPERACILLIN SODIUM, TAZOBACTAM SODIUM 3; .375 G/15ML; G/15ML
3.38 INJECTION, POWDER, LYOPHILIZED, FOR SOLUTION INTRAVENOUS EVERY 6 HOURS
Status: DISCONTINUED | OUTPATIENT
Start: 2024-09-12 | End: 2024-09-13

## 2024-09-12 RX ORDER — DILTIAZEM HYDROCHLORIDE 90 MG/1
90 CAPSULE, EXTENDED RELEASE ORAL 2 TIMES DAILY
Status: DISCONTINUED | OUTPATIENT
Start: 2024-09-12 | End: 2024-09-13 | Stop reason: HOSPADM

## 2024-09-12 RX ORDER — IOPAMIDOL 755 MG/ML
500 INJECTION, SOLUTION INTRAVASCULAR ONCE
Status: COMPLETED | OUTPATIENT
Start: 2024-09-12 | End: 2024-09-12

## 2024-09-12 RX ADMIN — BUMETANIDE 1 MG: 0.5 TABLET ORAL at 08:56

## 2024-09-12 RX ADMIN — PIPERACILLIN AND TAZOBACTAM 3.38 G: 3; .375 INJECTION, POWDER, FOR SOLUTION INTRAVENOUS at 20:58

## 2024-09-12 RX ADMIN — Medication 400 MG: at 13:25

## 2024-09-12 RX ADMIN — PIPERACILLIN AND TAZOBACTAM 3.38 G: 3; .375 INJECTION, POWDER, FOR SOLUTION INTRAVENOUS at 16:26

## 2024-09-12 RX ADMIN — SODIUM CHLORIDE 1000 ML: 9 INJECTION, SOLUTION INTRAVENOUS at 11:22

## 2024-09-12 RX ADMIN — Medication 400 MG: at 09:38

## 2024-09-12 RX ADMIN — APIXABAN 5 MG: 5 TABLET, FILM COATED ORAL at 21:26

## 2024-09-12 RX ADMIN — DILTIAZEM HYDROCHLORIDE 90 MG: 90 CAPSULE, EXTENDED RELEASE ORAL at 21:26

## 2024-09-12 RX ADMIN — DILTIAZEM HYDROCHLORIDE 90 MG: 90 CAPSULE, EXTENDED RELEASE ORAL at 09:56

## 2024-09-12 RX ADMIN — PSYLLIUM HUSK 1 PACKET: 3.4 POWDER ORAL at 08:56

## 2024-09-12 RX ADMIN — FINASTERIDE 5 MG: 5 TABLET, FILM COATED ORAL at 08:56

## 2024-09-12 RX ADMIN — METOPROLOL SUCCINATE 100 MG: 25 TABLET, EXTENDED RELEASE ORAL at 21:26

## 2024-09-12 RX ADMIN — Medication 10 MG/HR: at 08:04

## 2024-09-12 RX ADMIN — APIXABAN 5 MG: 5 TABLET, FILM COATED ORAL at 08:56

## 2024-09-12 RX ADMIN — SODIUM CHLORIDE 57 ML: 9 INJECTION, SOLUTION INTRAVENOUS at 13:39

## 2024-09-12 RX ADMIN — HUMAN ALBUMIN MICROSPHERES AND PERFLUTREN 2 ML: 10; .22 INJECTION, SOLUTION INTRAVENOUS at 07:42

## 2024-09-12 RX ADMIN — ALLOPURINOL 300 MG: 300 TABLET ORAL at 08:56

## 2024-09-12 RX ADMIN — IOPAMIDOL 68 ML: 755 INJECTION, SOLUTION INTRAVENOUS at 13:38

## 2024-09-12 ASSESSMENT — ACTIVITIES OF DAILY LIVING (ADL)
ADLS_ACUITY_SCORE: 27
ADLS_ACUITY_SCORE: 23
ADLS_ACUITY_SCORE: 28
ADLS_ACUITY_SCORE: 23
ADLS_ACUITY_SCORE: 27
ADLS_ACUITY_SCORE: 23
ADLS_ACUITY_SCORE: 23
ADLS_ACUITY_SCORE: 28
ADLS_ACUITY_SCORE: 23
ADLS_ACUITY_SCORE: 27
ADLS_ACUITY_SCORE: 23
ADLS_ACUITY_SCORE: 28
ADLS_ACUITY_SCORE: 23
ADLS_ACUITY_SCORE: 27
ADLS_ACUITY_SCORE: 28
ADLS_ACUITY_SCORE: 27
ADLS_ACUITY_SCORE: 27
ADLS_ACUITY_SCORE: 28
ADLS_ACUITY_SCORE: 27
ADLS_ACUITY_SCORE: 23

## 2024-09-12 NOTE — PLAN OF CARE
"Goal Outcome Evaluation:    VSS. Afebrile. Tele Afib CVR. EF 45-50%. On Po dilt. Soft Bps. Received NS bolus. Cardiology following. Mag replaced Po, recheck am. WBC 14.8. Blood culture pending. CT and US abdomen completed (see results). Started on IV zosyn. ID consulted. Up A1.       Plan of Care Reviewed With: patient    Overall Patient Progress: no change    Outcome Evaluation: afib cvr, on Po dilt. CT and US abd completed, started on IV zosyn, ID consulted.    Problem: Adult Inpatient Plan of Care  Goal: Plan of Care Review  Description: The Plan of Care Review/Shift note should be completed every shift.  The Outcome Evaluation is a brief statement about your assessment that the patient is improving, declining, or no change.  This information will be displayed automatically on your shift  note.  Outcome: Progressing  Flowsheets (Taken 9/12/2024 6715)  Outcome Evaluation: afib cvr, on Po dilt. CT and US abd completed, started on IV zosyn, ID consulted.  Plan of Care Reviewed With: patient  Overall Patient Progress: no change  Goal: Patient-Specific Goal (Individualized)  Description: You can add care plan individualizations to a care plan. Examples of Individualization might be:  \"Parent requests to be called daily at 9am for status\", \"I have a hard time hearing out of my right ear\", or \"Do not touch me to wake me up as it startles  me\".  Outcome: Progressing  Goal: Absence of Hospital-Acquired Illness or Injury  Outcome: Progressing  Intervention: Identify and Manage Fall Risk  Recent Flowsheet Documentation  Taken 9/12/2024 1315 by Silvia Daniels, RN  Safety Promotion/Fall Prevention:   activity supervised   clutter free environment maintained   lighting adjusted   nonskid shoes/slippers when out of bed   safety round/check completed  Intervention: Prevent Skin Injury  Recent Flowsheet Documentation  Taken 9/12/2024 1315 by Silvia Daniels, RN  Body Position: position changed independently  Goal: Optimal " Comfort and Wellbeing  Outcome: Progressing  Goal: Readiness for Transition of Care  Outcome: Progressing     Problem: Skin Injury Risk Increased  Goal: Skin Health and Integrity  Outcome: Progressing  Intervention: Optimize Skin Protection  Recent Flowsheet Documentation  Taken 9/12/2024 1315 by Silvia Daniels, RN  Activity Management: activity adjusted per tolerance     Problem: Dysrhythmia  Goal: Normalized Cardiac Rhythm  Outcome: Progressing     Problem: Fall Injury Risk  Goal: Absence of Fall and Fall-Related Injury  Outcome: Progressing  Intervention: Identify and Manage Contributors  Recent Flowsheet Documentation  Taken 9/12/2024 1315 by Silvia Daniels, RN  Medication Review/Management: medications reviewed  Intervention: Promote Injury-Free Environment  Recent Flowsheet Documentation  Taken 9/12/2024 1315 by Silvia Daniels, RN  Safety Promotion/Fall Prevention:   activity supervised   clutter free environment maintained   lighting adjusted   nonskid shoes/slippers when out of bed   safety round/check completed     Problem: Comorbidity Management  Goal: Maintenance of Heart Failure Symptom Control  Outcome: Progressing  Intervention: Maintain Heart Failure Management  Recent Flowsheet Documentation  Taken 9/12/2024 1315 by Silvia Daniels, RN  Medication Review/Management: medications reviewed  Goal: Blood Pressure in Desired Range  Outcome: Progressing  Intervention: Maintain Blood Pressure Management  Recent Flowsheet Documentation  Taken 9/12/2024 1315 by Silvia Daniels, RN  Medication Review/Management: medications reviewed

## 2024-09-12 NOTE — PROGRESS NOTES
[facility]    Medicine Progress Note - Hospitalist Service    Date of Admission:  9/11/2024    Assessment & Plan      Kristina Ramirez is a 83 year old female admitted on 9/11/2024 with recurrent atrial fibrillation with rapid ventricular response. She has history of permanent atrial fibrillation, previous cardioversions, hypertension, moderate to severe tricuspid valve regurgitation, peptic ulcer disease, and GERD.  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  She used to receive her care through the [facility] system in [place].  For the last year she has followed at [facility].  She had colon resection and chemotherapy.  Most recently she has been on FOLFOX through [facility].  She had radiation therapy to the metastases in her cervix.      She was admitted here from 9/9/2024 through 9/10/2024 with atrial fibrillation and rapid ventricular response.  In the previous few weeks she had been changed from metoprolol  mg daily to Coreg due to some fatigue.  This was not controlling her heart rate quite as well so she was changed back to metoprolol XL at 50 mg/day just a couple of days before presentation on 9/9/2024.  She presented to the ED on 9/9/2024 and was found to be in atrial fibrillation with rapid ventricular response.  Cardioversion was attempted 3 times without success.  She was started on diltiazem drip and was admitted for further cares.  Her heart rate improved.  She discharged home the next day on prior dose of metoprolol  mg daily with plans to follow-up with her cardiologist in the [facility] system.  She awoke this morning with tachycardia and recurrent fatigue.  She did have some brief chest pain with palpitations.  She denies shortness of breath although does fatigue with exertion.  She came to the ED for evaluation.    ED evaluation showed heart rate in the 130s with otherwise unremarkable vital signs.  Laboratory  evaluation showed white blood cells 12.4, magnesium 1.8, TSH 3.26, and otherwise unremarkable CBC and basic metabolic panel.  She was started on diltiazem drip.  I was asked to admit her for further cares.    Problem list:    Recurrent atrial fibrillation with rapid ventricular response  Permanent atrial fibrillation  Chronic anticoagulation with Eliquis  Previous cardioversions  Recent (9/9/2024) ED presentation and admission for atrial fibrillation with rapid ventricular response and failed cardioversion x 3  Heart failure with preserved ejection fraction versus mild decreased ejection fraction  Moderate to severe tricuspid cuspid valve regurgitation  -Admit as inpatient  -Patient had echo on 8/26/2024 that showed EF of 47%, severe biatrial enlargement, and moderate to severe tricuspid valve regurgitation.  Echo on 1/16/2024 showed EF of 50 to 55%  -She does not seem to have acute congestive heart failure  -Change metoprolol XL to 100 mg p.o. twice daily, starting tonight  -Continue diltiazem infusion for now, but wean as tolerated  -Limited echo tomorrow to reassess ejection fraction.  If ejection fraction is truly depressed would favor weaning off of diltiazem more quickly.  -Continue prior to admission Eliquis  -Cardiology consultation for consideration of cardioversion, rate control medication change, antiarrhythmic, ablation study, etc.  -N.p.o. after midnight pending cardiology consult  -Resume prior to admission Bumex, spironolactone, and Farxiga    Hypertension  -Continuing metoprolol    Stage IV adenocarcinoma of colon  Metastatic disease to liver and cervix  Previous resection  - CT findings suspicious for right lobe liver abscess vs metast.  She is reporting generalized weakness, poor level of energy and lack of appetite for several days.  Possible low fever.  She has leukocytosis, neutrophilia, elevated procalcitonin, no localizing symptoms, normal UA.  Will try and obtain a liver ultrasound for  further clarification, I started her on Zosyn and ask infectious disease opinion.  She is anticoagulated on DOAC for atrial fibrillation which has to be stopped in case of need for drainage by IR.      Previous chemotherapy (most recently FOLFOX at [facility])  -Sometimes has some cervical bleeding related to her metastatic disease, but none recently    Peptic ulcer disease  GERD  -Not currently on medication      ADDENDUM.  Liver US documented a mass without vascular activity which makes abscess diagnosis more likely. Radiologist is recommending MRI of the liver enhanced with Gadolinium . Order not placed until [facility] discuss it with patient and family for approval.        Diet: NPO per Anesthesia Guidelines for Procedure/Surgery Except for: Meds    DVT Prophylaxis: DOAC  Anthony Catheter: Not present  Lines: None     Cardiac Monitoring: ACTIVE order. Indication: Tachyarrhythmias, acute (48 hours)  Code Status: Full Code      Clinically Significant Risk Factors Present on Admission               # Drug Induced Coagulation Defect: home medication list includes an anticoagulant medication            Disposition Plan     Medically Ready for Discharge: Anticipated Tomorrow         Yahir García MD  Hospitalist Service  [facility]  Securely message with [employer] (more info)  Text page via [facility] Paging/Directory   ______________________________________________________________________    Interval History   Patient not in pain or having fever chills at the moment.  Daughter has reported some level of confusion to cardiology.  She has been suffering from lack of energy, poor appetite, very limited in her ambulation in the preceding days or weeks.  She has normal UA, elevated white count with neutrophilia and elevated procalcitonin but no other localizing signs.  Given her complicated history CT chest abdomen and pelvis with contrast has been obtained, findings suspicious for liver abscess versus  metastasis.    Physical Exam   Vital Signs: Temp: 99.4  F (37.4  C) Temp src: Oral BP: 106/51 Pulse: 96   Resp: 16 SpO2: 93 % O2 Device: None (Room air)    Weight: 135 lbs 12.8 oz    GEN:  Alert, oriented x 3, appears comfortable, NAD.  HEENT:  Normocephalic/atraumatic, no scleral icterus, no nasal discharge, mouth moist.  CV:  IRIR, adeline I/VI LSB .   LUNGS:  Clear to auscultation bilaterally without rales/rhonchi/wheezing/retractions.  Symmetric chest rise on inhalation noted.  ABD:  Active bowel sounds, soft, non-tender/non-distended.  No rebound/guarding/rigidity.  EXT:  No edema or cyanosis.  No joint synovitis noted.  SKIN:  Dry to touch, no exanthems noted in the visualized areas.         Medical Decision Making       50 MINUTES SPENT BY ME on the date of service doing chart review, history, exam, documentation & further activities per the note.      Data     I have personally reviewed the following data over the past 24 hrs:    12.4 (H)  \   13.1   / 358     133 (L) 94 (L) 20.8 /  115 (H)   3.6 24 1.04 (H) \     TSH: 3.26 T4: N/A A1C: N/A     Procal: N/A CRP: N/A Lactic Acid: 1.4         Imaging results reviewed over the past 24 hrs:   No results found for this or any previous visit (from the past 24 hour(s)).

## 2024-09-12 NOTE — CONSULTS
[facility]    Cardiology Consultation     Kristina Ramirez MRN#: [id number]   YOB: 1940 Age: 83 year old     Date of Admission:  9/11/2024    Consult Indication:  atrial fibrillation     Assessment & Plan     # Permanent atrial fibrillation, now with RVR in setting of suspected infection (UTI?)  # HFmrEF, stable, seems slightly dehydrated on exam  # Mod-severe to severe TR  # Metastatic cancer (untreated), colon to cervix   # Possible UTI  # Anemia     Primary cardiologist: Dr. Polk ([facility] cardiology)    - Infectious workup per Primary team   - Continue metoprolol succinate 100 mg twice daily  - Will transition diltiazem gtt. to p.o. regimen, will start with 90 mg twice daily  - Blood pressures are borderline, will hold spironolactone  - Will give 1 L of IV fluids, patient already received Bumex 1 mg this morning, reasonable to continue tomorrow  - Due to concern for possible UTI will hold Jardiance (on Farxiga outpatient)  - Recommend trending hemoglobin, may need to discontinue apixaban  - Cardiology will follow    Please do not hesitate to page with any questions or concerns.     Jose Raul Luque MD, [facility]  Cardiology  September 12, 2024    Voice recognition software utilized.   High complexity     History of Present Illness     83-year-old female with a history of atrial fibrillation, heart failure with preserved ejection fraction, severe TR, metastatic colon cancer (untreated),    Patient is followed closely with the [facility] cardiology group.  Patient has a history of atrial fibrillation, had been on carvedilol which was then changed to metoprolol.  Otherwise cardiac regimen includes apixaban, spironolactone, Bumex 1 mg daily, Farxiga.  Patient recently hospitalized  With atrial fibrillation with RVR, cardioversion was attempted in the ED but was unsuccessful, heart rates were controlled with diltiazem and resumption of metoprolol.  Patient presents now  with recurrent atrial fibrillation with RVR.  She denies any chest pain, chest pressure.  She also notes generalized fatigue, and her daughter reports that patient seems somewhat somnolent.  Patient was admitted to the Internal Medicine service with diltiazem gtt., metoprolol succinate was changed to 100 mg twice daily.  A limited TTE was done which demonstrated similar findings to prior report of LVEF 45 to 50%.  Moderate to severe TR.  Severe biatrial enlargement.  Labs notable for mild leukocytosis which is slightly worse today, hemoglobin 11.4, baseline seems to be around 11 to 13.  On inquiry, she does endorse that there has been increased urinary frequency, procalcitonin mildly elevated.    TTE 8/26/2024 ([facility])  Final Impressions:   Limited Echocardiogram performed    1. Normal left ventricular size, normal wall thickness, mildly reduced global systolic function, calculated EF of 47 %.    2. Right ventricular cavity size is normal, global systolic RV function is normal.    3. Severe biatrial enlargement.    4. Moderate-severe tricuspid regurgitation.    5. Borderline increased estimated pulmonary pressures by tricuspid regurgitation velocity and right atrial pressure (31 mmHg plus RAP).    6. The inferior vena cava is normal sized, respiratory size variation greater than 50%.    7. Overall similar findings compared to prior echo from 1/2024.       Past Medical History   Past Medical History:   Diagnosis Date    A-fib (H)     Colon cancer (H)        Past Surgical History   No past surgical history on file.    Prior to Admission Medications   Prior to Admission Medications   Prescriptions Last Dose Informant Patient Reported? Taking?   BIOTIN PO 9/11/2024  Yes Yes   Sig: Take 1 tablet by mouth daily.   allopurinol (ZYLOPRIM) 300 MG tablet 9/11/2024  Yes Yes   Sig: Take 300 mg by mouth daily.   apixaban ANTICOAGULANT (ELIQUIS) 5 MG tablet 9/11/2024 at am  Yes Yes   Sig: Take 5 mg by mouth 2 times daily.    bumetanide (BUMEX) 1 MG tablet 9/11/2024  Yes Yes   Sig: Take 1 mg by mouth daily.   dapagliflozin (FARXIGA) 10 MG TABS tablet 9/11/2024  Yes Yes   Sig: Take 10 mg by mouth daily.   finasteride (PROSCAR) 5 MG tablet 9/11/2024  Yes Yes   Sig: Take 5 mg by mouth daily.   metoprolol succinate ER (TOPROL XL) 100 MG 24 hr tablet 9/11/2024  No Yes   Sig: Take 2 tablets (200 mg) by mouth daily.   multivitamin w/minerals (THERA-VIT-M) tablet 9/11/2024  Yes Yes   Sig: Take 1 tablet by mouth daily.   psyllium (METAMUCIL/KONSYL) 58.6 % powder 9/11/2024  Yes Yes   Sig: Take 1 Tablespoonful by mouth daily.   spironolactone (ALDACTONE) 25 MG tablet 9/11/2024  Yes Yes   Sig: Take 25 mg by mouth daily.   vitamin D2 (ERGOCALCIFEROL) 70076 units (1250 mcg) capsule 9/11/2024  Yes Yes   Sig: Take 50,000 Units by mouth once a week. On Wednesdays      Facility-Administered Medications: None     Current Facility-Administered Medications   Medication Dose Route Frequency Provider Last Rate Last Admin    acetaminophen (TYLENOL) tablet 650 mg  650 mg Oral Q4H PRN Orestes Okeefe MD        Or    acetaminophen (TYLENOL) Suppository 650 mg  650 mg Rectal Q4H PRN Orestes Okeefe MD        allopurinol (ZYLOPRIM) tablet 300 mg  300 mg Oral Daily Orestes Okeefe MD        apixaban ANTICOAGULANT (ELIQUIS) tablet 5 mg  5 mg Oral BID Orestes Okeefe MD   5 mg at 09/11/24 2233    bisacodyl (DULCOLAX) suppository 10 mg  10 mg Rectal Daily PRN Orestes Okeefe MD        bumetanide (BUMEX) tablet 1 mg  1 mg Oral Daily Orestes Okeefe MD        calcium carbonate (TUMS) chewable tablet 1,000 mg  1,000 mg Oral 4x Daily PRN Orestes Okeefe MD        diltiazem ER (CARDIZEM SR) 12 hr capsule 90 mg  90 mg Oral BID Jose Raul Luque MD        empagliflozin (JARDIANCE) tablet 25 mg  25 mg Oral Daily Orestes Okeefe MD        finasteride (PROSCAR) tablet 5 mg  5 mg Oral Daily Orestes Okeefe MD        lidocaine (LMX4) cream   Topical Q1H PRN  Orestes Okeefe MD        lidocaine (LMX4) cream   Topical Q1H PRN Octavio Ramirez MD        lidocaine 1 % 0.1-1 mL  0.1-1 mL Other Q1H PRN Orestes Okeefe MD        lidocaine 1 % 0.1-1 mL  0.1-1 mL Other Q1H PRN Octavio Ramirez MD        melatonin tablet 1 mg  1 mg Oral At Bedtime PRN Orestes Okeefe MD        metoprolol succinate ER (TOPROL XL) 24 hr tablet 100 mg  100 mg Oral BID Orestes Okeefe MD   100 mg at 09/11/24 2233    ondansetron (ZOFRAN ODT) ODT tab 4 mg  4 mg Oral Q6H PRN Orestes Okeefe MD        Or    ondansetron (ZOFRAN) injection 4 mg  4 mg Intravenous Q6H PRN Orestes Okeefe MD        Patient is already receiving anticoagulation with heparin, enoxaparin (LOVENOX), warfarin (COUMADIN)  or other anticoagulant medication   Does not apply Continuous PRN Orestes Okeefe MD        polyethylene glycol (MIRALAX) Packet 17 g  17 g Oral BID PRN Orestes Okeefe MD        prochlorperazine (COMPAZINE) injection 5 mg  5 mg Intravenous Q6H PRN Orestes Okeefe MD        Or    prochlorperazine (COMPAZINE) tablet 5 mg  5 mg Oral Q6H PRN Orestes Okeefe MD        Or    prochlorperazine (COMPAZINE) suppository 12.5 mg  12.5 mg Rectal Q12H PRN Orestes Okeefe MD        psyllium (METAMUCIL/KONSYL) Packet 1 packet  1 packet Oral Daily Orestes Okeefe MD        senna-docusate (SENOKOT-S/PERICOLACE) 8.6-50 MG per tablet 1 tablet  1 tablet Oral BID PRN Orestes Okeefe MD        Or    senna-docusate (SENOKOT-S/PERICOLACE) 8.6-50 MG per tablet 2 tablet  2 tablet Oral BID PRN Orestes Okeefe MD        sodium chloride (PF) 0.9% PF flush 3 mL  3 mL Intracatheter Q8H Orestes Okeefe MD        sodium chloride (PF) 0.9% PF flush 3 mL  3 mL Intracatheter q1 min prn Orestes Okeefe MD        sodium chloride (PF) 0.9% PF flush 3 mL  3 mL Intracatheter Q8H Octavio Ramirez MD        sodium chloride (PF) 0.9% PF flush 3 mL  3 mL Intracatheter q1 min prn Octavio Ramirez  MD Rojas        [Held by provider] spironolactone (ALDACTONE) tablet 25 mg  25 mg Oral Daily Orestes Okeefe MD         Current Facility-Administered Medications   Medication Dose Route Frequency Provider Last Rate Last Admin    acetaminophen (TYLENOL) tablet 650 mg  650 mg Oral Q4H PRN Orestes Okeefe MD        Or    acetaminophen (TYLENOL) Suppository 650 mg  650 mg Rectal Q4H PRN Orestes Okeefe MD        allopurinol (ZYLOPRIM) tablet 300 mg  300 mg Oral Daily Orestes Okeefe MD        apixaban ANTICOAGULANT (ELIQUIS) tablet 5 mg  5 mg Oral BID Orestes Okeefe MD   5 mg at 09/11/24 2233    bisacodyl (DULCOLAX) suppository 10 mg  10 mg Rectal Daily PRN Orestes Okeefe MD        bumetanide (BUMEX) tablet 1 mg  1 mg Oral Daily Orestes Okeefe MD        calcium carbonate (TUMS) chewable tablet 1,000 mg  1,000 mg Oral 4x Daily PRN Orestes Okeefe MD        diltiazem ER (CARDIZEM SR) 12 hr capsule 90 mg  90 mg Oral BID Jose Raul Luque MD        empagliflozin (JARDIANCE) tablet 25 mg  25 mg Oral Daily Orestes Okeefe MD        finasteride (PROSCAR) tablet 5 mg  5 mg Oral Daily Orestes Okeefe MD        lidocaine (LMX4) cream   Topical Q1H PRN Orestes Okeefe MD        lidocaine (LMX4) cream   Topical Q1H PRN Octavio Ramirez MD        lidocaine 1 % 0.1-1 mL  0.1-1 mL Other Q1H PRN Orestes Okeefe MD        lidocaine 1 % 0.1-1 mL  0.1-1 mL Other Q1H PRN Octavio Ramirez MD        melatonin tablet 1 mg  1 mg Oral At Bedtime PRN Orestes Okeefe MD        metoprolol succinate ER (TOPROL XL) 24 hr tablet 100 mg  100 mg Oral BID Orestes Okeefe MD   100 mg at 09/11/24 2233    ondansetron (ZOFRAN ODT) ODT tab 4 mg  4 mg Oral Q6H PRN Orestes Okeefe MD        Or    ondansetron (ZOFRAN) injection 4 mg  4 mg Intravenous Q6H PRN Orestes Okeefe MD        Patient is already receiving anticoagulation with heparin, enoxaparin (LOVENOX), warfarin (COUMADIN)  or other  anticoagulant medication   Does not apply Continuous PRN Orestes Okeefe MD        polyethylene glycol (MIRALAX) Packet 17 g  17 g Oral BID PRN Orestes Okeefe MD        prochlorperazine (COMPAZINE) injection 5 mg  5 mg Intravenous Q6H PRN Orestes Okeefe MD        Or    prochlorperazine (COMPAZINE) tablet 5 mg  5 mg Oral Q6H PRN Orestes Okeefe MD        Or    prochlorperazine (COMPAZINE) suppository 12.5 mg  12.5 mg Rectal Q12H PRN Orestes Okeefe MD        psyllium (METAMUCIL/KONSYL) Packet 1 packet  1 packet Oral Daily Orestes Okeefe MD        senna-docusate (SENOKOT-S/PERICOLACE) 8.6-50 MG per tablet 1 tablet  1 tablet Oral BID PRN Orestes Okeefe MD        Or    senna-docusate (SENOKOT-S/PERICOLACE) 8.6-50 MG per tablet 2 tablet  2 tablet Oral BID PRN Orestes Okeefe MD        sodium chloride (PF) 0.9% PF flush 3 mL  3 mL Intracatheter Q8H Orestes Okeefe MD        sodium chloride (PF) 0.9% PF flush 3 mL  3 mL Intracatheter q1 min prn Orestes Okeefe MD        sodium chloride (PF) 0.9% PF flush 3 mL  3 mL Intracatheter Q8H Octavio Ramirez MD        sodium chloride (PF) 0.9% PF flush 3 mL  3 mL Intracatheter q1 min prn Octavio Ramirez MD        [Held by provider] spironolactone (ALDACTONE) tablet 25 mg  25 mg Oral Daily Orestes Okeefe MD         Allergies   Allergies   Allergen Reactions    Codeine Other (See Comments)     Does not like    Hydrocodone-Acetaminophen Other (See Comments)     Does not like    Oxycodone-Acetaminophen Other (See Comments)     Does not like    Venlafaxine Other (See Comments)       Social History    reports that she has never smoked. She does not have any smokeless tobacco history on file. She reports that she does not drink alcohol and does not use drugs.    Review of Systems   A comprehensive review of system was performed and is negative other than that noted in the HPI or here.     Physical Exam   Vital Signs with Ranges  Temp:   [98.4  F (36.9  C)-99.9  F (37.7  C)] 98.9  F (37.2  C)  Pulse:  [] 80  Resp:  [16-32] 16  BP: (100-145)/() 102/40  SpO2:  [91 %-98 %] 93 %  Wt Readings from Last 4 Encounters:   24 61.6 kg (135 lb 12.8 oz)   24 60.3 kg (133 lb)   24 65.2 kg (143 lb 11.8 oz)     I/O last 3 completed shifts:  In: 225 [P.O.:225]  Out: -     Vital signs were personally reviewed:  Temperatures:  Current - Temp: 98.9  F (37.2  C); Max - Temp  Av.3  F (37.4  C)  Min: 98.4  F (36.9  C)  Max: 99.9  F (37.7  C)  Respiration range: Resp  Av.7  Min: 16  Max: 32  Pulse range: Pulse  Av.4  Min: 77  Max: 160  Blood pressure range: Systolic (24hrs), Av , Min:100 , Max:145   ; Diastolic (24hrs), Av, Min:40, Max:109    Pulse oximetry range: SpO2  Av %  Min: 91 %  Max: 98 %    Intake/Output Summary (Last 24 hours) at 2024 0830  Last data filed at 2024 0600  Gross per 24 hour   Intake 225 ml   Output --   Net 225 ml     135 lbs 12.8 oz  Body mass index is 23.68 kg/m .   Body surface area is 1.66 meters squared.    Physical Exam:   General/Constitutional: appears stated age, in no apparent distress, appears to be well nourished  Respiratory: clear to auscultation bilaterally  Cardiovascular: JVP normal, regular rate, irreg irreg rhythm, 1/6 LIZA, no lower extremity edema    Laboratory tests personally reviewed:   CMP  Recent Labs   Lab 24  1630 09/10/24  0904 24  1841   * 135 136   POTASSIUM 3.6 3.8 3.9   CHLORIDE 94* 100 95*   CO2 24 25 27   ANIONGAP 15 10 14   * 112* 166*   BUN 20.8 18.9 28.6*   CR 1.04* 0.90 1.28*   GFRESTIMATED 53* 64 42*   AGAPITO 9.5 9.0 10.2   MAG 1.8  --  2.2     CBC  Recent Labs   Lab 24  1630 09/10/24  0904 24  1841   WBC 12.4* 7.2 10.5   RBC 4.23 3.73* 4.22   HGB 13.1 11.8 13.3   HCT 40.4 36.2 40.9   MCV 96 97 97   MCH 31.0 31.6 31.5   MCHC 32.4 32.6 32.5   RDW 15.3* 15.3* 15.4*    299 383     INRNo lab results found  "in last 7 days.  No results found for: \"TROPI\", \"TROPONIN\", \"TROPR\", \"TROPN\"  No results for input(s): \"CHOL\", \"HDL\", \"LDL\", \"TRIG\", \"CHOLHDLRATIO\" in the last 98333 hours.  No results found for: \"A1C\"  TSH   Date Value Ref Range Status   09/11/2024 3.26 0.30 - 4.20 uIU/mL Final       Clinically Significant Risk Factors Present on Admission               # Drug Induced Coagulation Defect: home medication list includes an anticoagulant medication      Cardiovascular: Tricuspid valve insufficiency    Oncology: Metastatic Cancer: colon Cancer Metastatic to cervix            "

## 2024-09-12 NOTE — PLAN OF CARE
"Pt admitted from the ED in Afib RVR. Pt is A/O. [place]. LS clear. Denies chest pain, palpitations, or dizziness. NPO since midnight. SBA. Needs help with IV pole. Tele Afib/flutter, RVR, CVR. On Dilt drip. Plan to see Cardiology.    Goal Outcome Evaluation:      Plan of Care Reviewed With: patient    Overall Patient Progress: improvingOverall Patient Progress: improving    Outcome Evaluation: HR sustaining under 100. On Dilt drip. SBP >100      Problem: Adult Inpatient Plan of Care  Goal: Plan of Care Review  Description: The Plan of Care Review/Shift note should be completed every shift.  The Outcome Evaluation is a brief statement about your assessment that the patient is improving, declining, or no change.  This information will be displayed automatically on your shift  note.  Outcome: Progressing  Flowsheets (Taken 9/12/2024 0327)  Outcome Evaluation: HR sustaining under 100. On Dilt drip. SBP >100  Plan of Care Reviewed With: patient  Overall Patient Progress: improving  Goal: Patient-Specific Goal (Individualized)  Description: You can add care plan individualizations to a care plan. Examples of Individualization might be:  \"Parent requests to be called daily at 9am for status\", \"I have a hard time hearing out of my right ear\", or \"Do not touch me to wake me up as it startles  me\".  Outcome: Progressing  Goal: Absence of Hospital-Acquired Illness or Injury  Outcome: Progressing  Intervention: Identify and Manage Fall Risk  Recent Flowsheet Documentation  Taken 9/11/2024 2100 by Mayelin Whaley, RN  Safety Promotion/Fall Prevention:   activity supervised   clutter free environment maintained   lighting adjusted   nonskid shoes/slippers when out of bed   safety round/check completed  Intervention: Prevent Skin Injury  Recent Flowsheet Documentation  Taken 9/11/2024 2052 by Mayelin Whaley, RN  Body Position: position changed independently  Goal: Optimal Comfort and Wellbeing  Outcome: " Progressing  Goal: Readiness for Transition of Care  Outcome: Progressing  Intervention: Mutually Develop Transition Plan  Recent Flowsheet Documentation  Taken 9/11/2024 2100 by Mayelin Whaley RN  Equipment Currently Used at Home: none     Problem: Skin Injury Risk Increased  Goal: Skin Health and Integrity  Outcome: Progressing  Intervention: Optimize Skin Protection  Recent Flowsheet Documentation  Taken 9/11/2024 2052 by Mayelin Whaley RN  Activity Management: ambulated to bathroom  Head of Bed (HOB) Positioning: HOB at 60-90 degrees     Problem: Dysrhythmia  Goal: Normalized Cardiac Rhythm  Outcome: Progressing     Problem: Fall Injury Risk  Goal: Absence of Fall and Fall-Related Injury  Outcome: Progressing  Intervention: Identify and Manage Contributors  Recent Flowsheet Documentation  Taken 9/11/2024 2100 by Mayelin Whaley RN  Medication Review/Management: medications reviewed  Intervention: Promote Injury-Free Environment  Recent Flowsheet Documentation  Taken 9/11/2024 2100 by Mayelin Whaley RN  Safety Promotion/Fall Prevention:   activity supervised   clutter free environment maintained   lighting adjusted   nonskid shoes/slippers when out of bed   safety round/check completed     Problem: Comorbidity Management  Goal: Maintenance of Heart Failure Symptom Control  Outcome: Progressing  Intervention: Maintain Heart Failure Management  Recent Flowsheet Documentation  Taken 9/11/2024 2100 by Mayelin Whaley RN  Medication Review/Management: medications reviewed  Goal: Blood Pressure in Desired Range  Outcome: Progressing  Intervention: Maintain Blood Pressure Management  Recent Flowsheet Documentation  Taken 9/11/2024 2100 by Mayelin Whaley RN  Medication Review/Management: medications reviewed

## 2024-09-12 NOTE — H&P
[facility]    History and Physical - Hospitalist Service       Date of Admission:  9/11/2024    Assessment & Plan      Kristina Ramirez is a 83 year old female admitted on 9/11/2024 with recurrent atrial fibrillation with rapid ventricular response. She has history of permanent atrial fibrillation, previous cardioversions, hypertension, moderate to severe tricuspid valve regurgitation, peptic ulcer disease, and GERD.  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  She used to receive her care through the [facility] system in [place].  For the last year she has followed at [facility].  She had colon resection and chemotherapy.  Most recently she has been on FOLFOX through [facility].  She had radiation therapy to the metastases in her cervix.      She was admitted here from 9/9/2024 through 9/10/2024 with atrial fibrillation and rapid ventricular response.  In the previous few weeks she had been changed from metoprolol  mg daily to Coreg due to some fatigue.  This was not controlling her heart rate quite as well so she was changed back to metoprolol XL at 50 mg/day just a couple of days before presentation on 9/9/2024.  She presented to the ED on 9/9/2024 and was found to be in atrial fibrillation with rapid ventricular response.  Cardioversion was attempted 3 times without success.  She was started on diltiazem drip and was admitted for further cares.  Her heart rate improved.  She discharged home the next day on prior dose of metoprolol  mg daily with plans to follow-up with her cardiologist in the [facility] system.  She awoke this morning with tachycardia and recurrent fatigue.  She did have some brief chest pain with palpitations.  She denies shortness of breath although does fatigue with exertion.  She came to the ED for evaluation.    ED evaluation showed heart rate in the 130s with otherwise unremarkable vital signs.  Laboratory  evaluation showed white blood cells 12.4, magnesium 1.8, TSH 3.26, and otherwise unremarkable CBC and basic metabolic panel.  She was started on diltiazem drip.  I was asked to admit her for further cares.    Problem list:    Recurrent atrial fibrillation with rapid ventricular response  Permanent atrial fibrillation  Chronic anticoagulation with Eliquis  Previous cardioversions  Recent (9/9/2024) ED presentation and admission for atrial fibrillation with rapid ventricular response and failed cardioversion x 3  Heart failure with preserved ejection fraction versus mild decreased ejection fraction  Moderate to severe tricuspid cuspid valve regurgitation  -Admit as inpatient  -Patient had echo on 8/26/2024 that showed EF of 47%, severe biatrial enlargement, and moderate to severe tricuspid valve regurgitation.  Echo on 1/16/2024 showed EF of 50 to 55%  -She does not seem to have acute congestive heart failure  -Change metoprolol XL to 100 mg p.o. twice daily, starting tonight  -Continue diltiazem infusion for now, but wean as tolerated  -Limited echo tomorrow to reassess ejection fraction.  If ejection fraction is truly depressed would favor weaning off of diltiazem more quickly.  -Continue prior to admission Eliquis  -Cardiology consultation for consideration of cardioversion, rate control medication change, antiarrhythmic, ablation study, etc.  -N.p.o. after midnight pending cardiology consult  -Resume prior to admission Bumex, spironolactone, and Farxiga    Hypertension  -Continuing metoprolol    Stage IV adenocarcinoma of colon  Metastatic disease to liver and cervix  Previous resection  Previous chemotherapy (most recently FOLFOX at [place])  -Seems stable  -Sometimes has some cervical bleeding related to her metastatic disease, but none recently    Peptic ulcer disease  GERD  -Not currently on medication          Diet: Combination Diet Low Saturated Fat Na <2400mg Diet, No Caffeine Diet  NPO per Anesthesia  Guidelines for Procedure/Surgery Except for: Meds    DVT Prophylaxis: DOAC  Anthony Catheter: Not present  Lines: None     Cardiac Monitoring: ACTIVE order. Indication: Tachyarrhythmias, acute (48 hours)  Code Status: Full Code      Clinically Significant Risk Factors Present on Admission               # Drug Induced Coagulation Defect: home medication list includes an anticoagulant medication                             Disposition Plan     Medically Ready for Discharge: Anticipated in 2-4 Days           Orestes Okeefe MD  Hospitalist Service  [facility]  Securely message with [place] (more info)  Text page via [facility] Paging/Directory     ______________________________________________________________________    Chief Complaint   Tachycardia, weakness, fatigue with exertion    History is obtained from the patient, her , Dr. Gaviria, and the medical record    History of Present Illness   Kristina Ramirez is a 83 year old female admitted on 9/11/2024 with recurrent atrial fibrillation with rapid ventricular response. She has history of permanent atrial fibrillation, previous cardioversions, hypertension, moderate to severe tricuspid valve regurgitation, peptic ulcer disease, and GERD.  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  She used to receive her care through the [facility] system in [place].  For the last year she has followed at [facility].  She had colon resection and chemotherapy.  Most recently she has been on FOLFOX through [facility].  She had radiation therapy to the metastases in her cervix.      She was admitted here from 9/9/2024 through 9/10/2024 with atrial fibrillation and rapid ventricular response.  In the previous few weeks she had been changed from metoprolol  mg daily to Coreg due to some fatigue.  This was not controlling her heart rate quite as well so she was changed back to metoprolol XL at 50 mg/day just  a couple of days before presentation on 9/9/2024.  She presented to the ED on 9/9/2024 and was found to be in atrial fibrillation with rapid ventricular response.  Cardioversion was attempted 3 times without success.  She was started on diltiazem drip and was admitted for further cares.  Her heart rate improved.  She discharged home the next day on prior dose of metoprolol  mg daily with plans to follow-up with her cardiologist in the [facility] system.  She awoke this morning with tachycardia and recurrent fatigue.  She did have some brief chest pain with palpitations.  She denies shortness of breath although does fatigue with exertion.  She came to the ED for evaluation.    ED evaluation showed heart rate in the 130s with otherwise unremarkable vital signs.  Laboratory evaluation showed white blood cells 12.4, magnesium 1.8, TSH 3.26, and otherwise unremarkable CBC and basic metabolic panel.  She was started on diltiazem drip.  I was asked to admit her for further cares.      Past Medical History    Past Medical History:   Diagnosis Date    A-fib (H)     Colon cancer (H)    Peptic ulcer disease  GERD  Tricuspid valve regurgitation    Past Surgical History   Hemicolectomy for colon cancer    Prior to Admission Medications   Prior to Admission Medications   Prescriptions Last Dose Informant Patient Reported? Taking?   BIOTIN PO   Yes No   Sig: Take 1 tablet by mouth daily.   allopurinol (ZYLOPRIM) 300 MG tablet   Yes No   Sig: Take 300 mg by mouth daily.   apixaban ANTICOAGULANT (ELIQUIS) 5 MG tablet   Yes No   Sig: Take 5 mg by mouth 2 times daily.   bumetanide (BUMEX) 1 MG tablet   Yes No   Sig: Take 1 mg by mouth daily.   dapagliflozin (FARXIGA) 10 MG TABS tablet   Yes No   Sig: Take 10 mg by mouth daily.   finasteride (PROSCAR) 5 MG tablet   Yes No   Sig: Take 5 mg by mouth daily.   metoprolol succinate ER (TOPROL XL) 100 MG 24 hr tablet   No No   Sig: Take 2 tablets (200 mg) by mouth daily.   multivitamin  w/minerals (THERA-VIT-M) tablet   Yes No   Sig: Take 1 tablet by mouth daily.   psyllium (METAMUCIL/KONSYL) 58.6 % powder   Yes No   Sig: Take 1 Tablespoonful by mouth daily.   spironolactone (ALDACTONE) 25 MG tablet   Yes No   Sig: Take 25 mg by mouth daily.   vitamin D2 (ERGOCALCIFEROL) 04415 units (1250 mcg) capsule   Yes No   Sig: Take 50,000 Units by mouth once a week. On Wednesdays      Facility-Administered Medications: None        Review of Systems    The 10 point Review of Systems is negative other than noted in the HPI or here.     Social History   I have reviewed this patient's social history and updated it with pertinent information if needed.  Social History     Tobacco Use    Smoking status: Never   Substance Use Topics    Alcohol use: Never    Drug use: Never         Family History     Hypertension  Hypothyroidism  Stroke  Breast cancer  Blood clot related to chemotherapy      Allergies   Allergies   Allergen Reactions    Codeine Other (See Comments)     Does not like    Hydrocodone-Acetaminophen Other (See Comments)     Does not like    Oxycodone-Acetaminophen Other (See Comments)     Does not like    Venlafaxine Other (See Comments)        Physical Exam   Vital Signs: Temp: 98.4  F (36.9  C) Temp src: Temporal BP: 126/87 Pulse: 94   Resp: (!) 31 SpO2: 94 % O2 Device: None (Room air)    Weight: 135 lbs 5.8 oz    GENERAL: Pleasant and cooperative. No acute distress.  EYES: Pupils equal and round. No scleral erythema or icterus.  ENT: External ears are normal without deformity. Posterior oropharynx is without erythem, swelling, or exudate.  NECK: Supple. No masses or swelling. No tenderness. Thyroid is normal without mass or tenderness.  CHEST: Clear to auscultation. Normal breath sounds. No retractions.   CV: Rapid rate with a regular rhythm. No JVD. Pulses normal.  ABDOMEN: Bowel sounds present. No tenderness. No masses or hernia.  EXTREMETIES: No clubbing, cyanosis, or ischemia.  No peripheral  edema  SKIN: Warm and dry to touch. No wounds or rashes.  NEUROLOGIC: Strength and sensation are normal. Deep tendon reflexes are normal. Cranial nerves are normal.      Medical Decision Making       85 MINUTES SPENT BY ME on the date of service doing chart review, history, exam, documentation & further activities per the note.      Data     I have personally reviewed the following data over the past 24 hrs:    12.4 (H)  \   13.1   / 358     133 (L) 94 (L) 20.8 /  115 (H)   3.6 24 1.04 (H) \     TSH: 3.26 T4: N/A A1C: N/A       Imaging results reviewed over the past 24 hrs:   No results found for this or any previous visit (from the past 24 hour(s)).  Recent Labs   Lab 09/11/24  1630 09/10/24  0904 09/09/24  1841   WBC 12.4* 7.2 10.5   HGB 13.1 11.8 13.3   MCV 96 97 97    299 383   * 135 136   POTASSIUM 3.6 3.8 3.9   CHLORIDE 94* 100 95*   CO2 24 25 27   BUN 20.8 18.9 28.6*   CR 1.04* 0.90 1.28*   ANIONGAP 15 10 14   AGAPITO 9.5 9.0 10.2   * 112* 166*

## 2024-09-12 NOTE — PLAN OF CARE
"Shift summary ([id number])    Pt Ox4. VSS except asymptomatic soft BP's on RA. Denies pain. Tele AF CVR w/ BBB / invert T's, denies CP. LPIV intact, SL. Up Ax1 GB. Maintains NPO. Replacing Mg per protocol w/ recheck 9/13 AM. Had ECHO this AM, cards following. Transitioned off dilt drip and started PO per orders.     Goal Outcome Evaluation:      Plan of Care Reviewed With: patient    Overall Patient Progress: improvingOverall Patient Progress: improving    Outcome Evaluation: Dilt drip stopped @ 0841 per orders. Started PO dilt this AM. Denies CP.      Problem: Adult Inpatient Plan of Care  Goal: Plan of Care Review  Description: The Plan of Care Review/Shift note should be completed every shift.  The Outcome Evaluation is a brief statement about your assessment that the patient is improving, declining, or no change.  This information will be displayed automatically on your shift  note.  Outcome: Progressing  Flowsheets (Taken 9/12/2024 0922)  Outcome Evaluation: Dilt drip stopped @ 0841 per orders. Started PO dilt this AM. Denies CP.  Plan of Care Reviewed With: patient  Overall Patient Progress: improving  Goal: Patient-Specific Goal (Individualized)  Description: You can add care plan individualizations to a care plan. Examples of Individualization might be:  \"Parent requests to be called daily at 9am for status\", \"I have a hard time hearing out of my right ear\", or \"Do not touch me to wake me up as it startles  me\".  Outcome: Progressing  Goal: Absence of Hospital-Acquired Illness or Injury  Outcome: Progressing  Intervention: Identify and Manage Fall Risk  Recent Flowsheet Documentation  Taken 9/12/2024 0811 by Ioana Mares, RN  Safety Promotion/Fall Prevention:   activity supervised   assistive device/personal items within reach   clutter free environment maintained   increased rounding and observation   increase visualization of patient   lighting adjusted   mobility aid in reach   nonskid " shoes/slippers when out of bed   patient and family education   room door open   room near nurse's station   room organization consistent   safety round/check completed   supervised activity   treat reversible contributory factors   treat underlying cause  Taken 9/12/2024 0719 by Ioana Mares RN  Safety Promotion/Fall Prevention: (ECHO at bedside) safety round/check completed  Intervention: Prevent Skin Injury  Recent Flowsheet Documentation  Taken 9/12/2024 0811 by Ioana Mares RN  Body Position:   position changed independently   supine, head elevated  Skin Protection:   adhesive use limited   incontinence pads utilized  Device Skin Pressure Protection:   absorbent pad utilized/changed   adhesive use limited   pressure points protected   tubing/devices free from skin contact  Intervention: Prevent and Manage VTE (Venous Thromboembolism) Risk  Recent Flowsheet Documentation  Taken 9/12/2024 0811 by Ioana Mares RN  VTE Prevention/Management: compression stockings on, PO Eliquis  Intervention: Prevent Infection  Recent Flowsheet Documentation  Taken 9/12/2024 0811 by Ioana Mares RN  Infection Prevention:   equipment surfaces disinfected   hand hygiene promoted   personal protective equipment utilized   rest/sleep promoted   single patient room provided  Goal: Optimal Comfort and Wellbeing  Outcome: Progressing  Goal: Readiness for Transition of Care  Outcome: Progressing

## 2024-09-12 NOTE — ED NOTES
[facility]  ED Nurse Handoff Report    ED Chief complaint: Tachycardia  . ED Diagnosis:   Final diagnoses:   Atrial fibrillation with rapid ventricular response (H)       Allergies:   Allergies   Allergen Reactions    Codeine Other (See Comments)     Does not like    Hydrocodone-Acetaminophen Other (See Comments)     Does not like    Oxycodone-Acetaminophen Other (See Comments)     Does not like    Venlafaxine Other (See Comments)       Code Status: Full Code    Activity level - Baseline/Home:  independent.  Activity Level - Current:   assist of 1.   Lift room needed: No.   Bariatric: No   Needed: No   Isolation: No.   Infection: Not Applicable.     Respiratory status: Room air    Vital Signs (within 30 minutes):   Vitals:    09/11/24 1844 09/11/24 1900 09/11/24 1910 09/11/24 1920   BP:  (!) 145/109  (!) 135/107   Pulse: (!) 137 (!) 130  (!) 140   Resp: 28  19    Temp:       TempSrc:       SpO2: 96% 97%  93%   Weight:       Height:           Cardiac Rhythm:  ,   Cardiac  Cardiac Rhythm: Other (Comment) (A fib RVR)  Pain level:    Patient confused: No.   Patient Falls Risk: nonskid shoes/slippers when out of bed, arm band in place, patient and family education, activity supervised, and toileting schedule implemented.   Elimination Status: Has voided     Patient Report - Initial Complaint: Tachycardia.   Focused Assessment:   ED arrival note:  Pt states she was in the hospital yesterday for the same symptoms. Pt comes in with elevated heart rate. She states she was cardioverted X3 and was given IV medications. She states the heart rate was ok until this afternoon when she noted her heart rate again was elevated. She comes in for assessment. She is currently wearing a halter monitor.   Cardiac (Adult)Cardiac WDL: .WDL exceptPulse Rate & Regularity: tachycardicCardiac Rhythm: Other (Comment) (A fib RVR)  Chest Pain AssessmentChest Pain Intervention: cardiac monitoring continued; 12-lead  ECG obtained; activity minimized    Abnormal Results:   Labs Ordered and Resulted from Time of ED Arrival to Time of ED Departure   BASIC METABOLIC PANEL - Abnormal       Result Value    Sodium 133 (*)     Potassium 3.6      Chloride 94 (*)     Carbon Dioxide (CO2) 24      Anion Gap 15      Urea Nitrogen 20.8      Creatinine 1.04 (*)     GFR Estimate 53 (*)     Calcium 9.5      Glucose 115 (*)    CBC WITH PLATELETS AND DIFFERENTIAL - Abnormal    WBC Count 12.4 (*)     RBC Count 4.23      Hemoglobin 13.1      Hematocrit 40.4      MCV 96      MCH 31.0      MCHC 32.4      RDW 15.3 (*)     Platelet Count 358      % Neutrophils 81      % Lymphocytes 9      % Monocytes 8      % Eosinophils 1      % Basophils 0      % Immature Granulocytes 1      NRBCs per 100 WBC 0      Absolute Neutrophils 10.0 (*)     Absolute Lymphocytes 1.2      Absolute Monocytes 1.0      Absolute Eosinophils 0.1      Absolute Basophils 0.1      Absolute Immature Granulocytes 0.1      Absolute NRBCs 0.0     MAGNESIUM - Normal    Magnesium 1.8     TSH WITH FREE T4 REFLEX - Normal    TSH 3.26          No orders to display       Treatments provided: See MAR  Family Comments:  at bedside  OBS brochure/video discussed/provided to patient:  N/A  ED Medications:   Medications   diltiazem (CARDIZEM) 125 mg in dextrose 5 % 125 mL infusion (5 mg/hr Intravenous $New Bag 9/11/24 1927)   metoprolol (LOPRESSOR) injection 5 mg (5 mg Intravenous $Given 9/11/24 1827)   sodium chloride 0.9% BOLUS 1,000 mL (1,000 mLs Intravenous $New Bag 9/11/24 1726)   diltiazem (CARDIZEM) injection 20 mg (20 mg Intravenous $Given 9/11/24 1925)       Drips infusing:  Yes diltiazem  For the majority of the shift this patient was [place].   Interventions performed were N/A.    Sepsis treatment initiated: No    Cares/treatment/interventions/medications to be completed following ED care: N/A    ED Nurse Name: Leyla Cha RN  7:36 PM

## 2024-09-12 NOTE — PHARMACY-ADMISSION MEDICATION HISTORY
Pharmacist Admission Medication History    Admission medication history is complete. The information provided in this note is only as accurate as the sources available at the time of the update.    Information Source(s): Patient via in-person    Pertinent Information: patient was dc'ed yesterday (9/10/24) and admitted today    Changes made to PTA medication list:  Added: None  Deleted: None  Changed: None    Allergies reviewed with patient and updates made in EHR: yes    Medication History Completed By: Vincent Kat RPH 9/11/2024 8:47 PM    PTA Med List   Medication Sig Last Dose    allopurinol (ZYLOPRIM) 300 MG tablet Take 300 mg by mouth daily. 9/11/2024    apixaban ANTICOAGULANT (ELIQUIS) 5 MG tablet Take 5 mg by mouth 2 times daily. 9/11/2024 at am    BIOTIN PO Take 1 tablet by mouth daily. 9/11/2024    bumetanide (BUMEX) 1 MG tablet Take 1 mg by mouth daily. 9/11/2024    dapagliflozin (FARXIGA) 10 MG TABS tablet Take 10 mg by mouth daily. 9/11/2024    finasteride (PROSCAR) 5 MG tablet Take 5 mg by mouth daily. 9/11/2024    metoprolol succinate ER (TOPROL XL) 100 MG 24 hr tablet Take 2 tablets (200 mg) by mouth daily. 9/11/2024    multivitamin w/minerals (THERA-VIT-M) tablet Take 1 tablet by mouth daily. 9/11/2024    psyllium (METAMUCIL/KONSYL) 58.6 % powder Take 1 Tablespoonful by mouth daily. 9/11/2024    spironolactone (ALDACTONE) 25 MG tablet Take 25 mg by mouth daily. 9/11/2024    vitamin D2 (ERGOCALCIFEROL) 43736 units (1250 mcg) capsule Take 50,000 Units by mouth once a week. On Wednesdays 9/11/2024

## 2024-09-13 VITALS
DIASTOLIC BLOOD PRESSURE: 54 MMHG | HEART RATE: 82 BPM | TEMPERATURE: 98.4 F | OXYGEN SATURATION: 92 % | BODY MASS INDEX: 23.39 KG/M2 | SYSTOLIC BLOOD PRESSURE: 124 MMHG | RESPIRATION RATE: 19 BRPM | HEIGHT: 64 IN | WEIGHT: 137 LBS

## 2024-09-13 LAB
ALBUMIN SERPL BCG-MCNC: 3.1 G/DL (ref 3.5–5.2)
ALP SERPL-CCNC: 284 U/L (ref 40–150)
ALT SERPL W P-5'-P-CCNC: 18 U/L (ref 0–50)
ANION GAP SERPL CALCULATED.3IONS-SCNC: 12 MMOL/L (ref 7–15)
AST SERPL W P-5'-P-CCNC: 42 U/L (ref 0–45)
BASOPHILS # BLD AUTO: 0 10E3/UL (ref 0–0.2)
BASOPHILS NFR BLD AUTO: 0 %
BILIRUB SERPL-MCNC: 1.2 MG/DL
BUN SERPL-MCNC: 14.4 MG/DL (ref 8–23)
CALCIUM SERPL-MCNC: 8.7 MG/DL (ref 8.8–10.4)
CHLORIDE SERPL-SCNC: 102 MMOL/L (ref 98–107)
CREAT SERPL-MCNC: 0.92 MG/DL (ref 0.51–0.95)
CRP SERPL-MCNC: 221.33 MG/L
EGFRCR SERPLBLD CKD-EPI 2021: 62 ML/MIN/1.73M2
EOSINOPHIL # BLD AUTO: 0.1 10E3/UL (ref 0–0.7)
EOSINOPHIL NFR BLD AUTO: 1 %
ERYTHROCYTE [DISTWIDTH] IN BLOOD BY AUTOMATED COUNT: 15.6 % (ref 10–15)
GLUCOSE SERPL-MCNC: 121 MG/DL (ref 70–99)
HCO3 SERPL-SCNC: 21 MMOL/L (ref 22–29)
HCT VFR BLD AUTO: 32.1 % (ref 35–47)
HGB BLD-MCNC: 10.3 G/DL (ref 11.7–15.7)
IMM GRANULOCYTES # BLD: 0.2 10E3/UL
IMM GRANULOCYTES NFR BLD: 1 %
LYMPHOCYTES # BLD AUTO: 1.2 10E3/UL (ref 0.8–5.3)
LYMPHOCYTES NFR BLD AUTO: 10 %
MAGNESIUM SERPL-MCNC: 2 MG/DL (ref 1.7–2.3)
MCH RBC QN AUTO: 30.9 PG (ref 26.5–33)
MCHC RBC AUTO-ENTMCNC: 32.1 G/DL (ref 31.5–36.5)
MCV RBC AUTO: 96 FL (ref 78–100)
MONOCYTES # BLD AUTO: 1.3 10E3/UL (ref 0–1.3)
MONOCYTES NFR BLD AUTO: 10 %
NEUTROPHILS # BLD AUTO: 9.4 10E3/UL (ref 1.6–8.3)
NEUTROPHILS NFR BLD AUTO: 77 %
NRBC # BLD AUTO: 0 10E3/UL
NRBC BLD AUTO-RTO: 0 /100
PLATELET # BLD AUTO: 253 10E3/UL (ref 150–450)
POTASSIUM SERPL-SCNC: 3.6 MMOL/L (ref 3.4–5.3)
PROCALCITONIN SERPL IA-MCNC: 0.66 NG/ML
PROT SERPL-MCNC: 6.7 G/DL (ref 6.4–8.3)
RBC # BLD AUTO: 3.33 10E6/UL (ref 3.8–5.2)
SODIUM SERPL-SCNC: 135 MMOL/L (ref 135–145)
WBC # BLD AUTO: 12.2 10E3/UL (ref 4–11)

## 2024-09-13 PROCEDURE — 36415 COLL VENOUS BLD VENIPUNCTURE: CPT | Performed by: HOSPITALIST

## 2024-09-13 PROCEDURE — 84145 PROCALCITONIN (PCT): CPT | Performed by: HOSPITALIST

## 2024-09-13 PROCEDURE — 99233 SBSQ HOSP IP/OBS HIGH 50: CPT | Mod: FS | Performed by: PHYSICIAN ASSISTANT

## 2024-09-13 PROCEDURE — 85041 AUTOMATED RBC COUNT: CPT | Performed by: HOSPITALIST

## 2024-09-13 PROCEDURE — 250N000013 HC RX MED GY IP 250 OP 250 PS 637: Performed by: INTERNAL MEDICINE

## 2024-09-13 PROCEDURE — 83735 ASSAY OF MAGNESIUM: CPT | Performed by: HOSPITALIST

## 2024-09-13 PROCEDURE — 86140 C-REACTIVE PROTEIN: CPT | Performed by: HOSPITALIST

## 2024-09-13 PROCEDURE — 99222 1ST HOSP IP/OBS MODERATE 55: CPT | Performed by: INTERNAL MEDICINE

## 2024-09-13 PROCEDURE — 99238 HOSP IP/OBS DSCHRG MGMT 30/<: CPT | Performed by: INTERNAL MEDICINE

## 2024-09-13 PROCEDURE — 80053 COMPREHEN METABOLIC PANEL: CPT | Performed by: HOSPITALIST

## 2024-09-13 PROCEDURE — 250N000011 HC RX IP 250 OP 636: Performed by: HOSPITALIST

## 2024-09-13 PROCEDURE — 250N000013 HC RX MED GY IP 250 OP 250 PS 637: Performed by: PHYSICIAN ASSISTANT

## 2024-09-13 RX ORDER — METOPROLOL SUCCINATE 100 MG/1
100 TABLET, EXTENDED RELEASE ORAL 2 TIMES DAILY
Status: SHIPPED
Start: 2024-09-13

## 2024-09-13 RX ORDER — DILTIAZEM HYDROCHLORIDE 90 MG/1
90 CAPSULE, EXTENDED RELEASE ORAL 2 TIMES DAILY
Qty: 60 CAPSULE | Refills: 1 | Status: SHIPPED | OUTPATIENT
Start: 2024-09-13

## 2024-09-13 RX ORDER — BUMETANIDE 0.5 MG/1
0.5 TABLET ORAL DAILY
Qty: 30 TABLET | Refills: 0 | Status: SHIPPED | OUTPATIENT
Start: 2024-09-14

## 2024-09-13 RX ADMIN — METOPROLOL SUCCINATE 100 MG: 25 TABLET, EXTENDED RELEASE ORAL at 09:25

## 2024-09-13 RX ADMIN — DILTIAZEM HYDROCHLORIDE 90 MG: 90 CAPSULE, EXTENDED RELEASE ORAL at 09:43

## 2024-09-13 RX ADMIN — ALLOPURINOL 300 MG: 300 TABLET ORAL at 09:43

## 2024-09-13 RX ADMIN — APIXABAN 5 MG: 5 TABLET, FILM COATED ORAL at 09:25

## 2024-09-13 RX ADMIN — FINASTERIDE 5 MG: 5 TABLET, FILM COATED ORAL at 09:43

## 2024-09-13 RX ADMIN — BUMETANIDE 0.5 MG: 0.5 TABLET ORAL at 09:43

## 2024-09-13 RX ADMIN — PSYLLIUM HUSK 1 PACKET: 3.4 POWDER ORAL at 09:43

## 2024-09-13 RX ADMIN — PIPERACILLIN AND TAZOBACTAM 3.38 G: 3; .375 INJECTION, POWDER, FOR SOLUTION INTRAVENOUS at 03:28

## 2024-09-13 ASSESSMENT — ACTIVITIES OF DAILY LIVING (ADL)
ADLS_ACUITY_SCORE: 27

## 2024-09-13 NOTE — CONSULTS
[facility]    Infectious Disease Consultation     Date of Admission:  9/11/2024  Date of Consult (When I saw the patient): 09/13/24    Assessment & Plan   Kristina Ramirez is a 83 year old female who was admitted on 9/11/2024.     Impression: 1 83-year-old female with known metastatic colon cancer including liver involvement, now admitted with rapid atrial fibrillation and other symptoms  2 imaging with enlarging liver lesion of note was present in the same location 2 months ago in the [facility] system, highly likely this is malignancy, imaging to me does not look like abscess nothing clinically to suggest that diagnosis    REC 1 agree with stopping antibiotics even if this is liver abscess antibiotics are irrelevant currently unless we drain it, obvious question is whether we should biopsy this now does not look like its actually been proven to be malignancy from the imaging in August whether that changes cancer treatment is unclear.  If on discussion with radiology now felt this is not at all and abscess okay disposition and plan biopsy later if indicated.        Rafa Nevarez MD    Reason for Consult   Reason for consult: I was asked to evaluate this patient for liver abscess.    Primary Care Physician   Mayelin Anderson    Chief Complaint   Weakness and rapid atrial fibrillation    History is obtained from the patient and medical records    History of Present Illness   Kristina Ramirez is a 83 year old female who presents with known history of metastatic colon cancer being treated in [facility] and [facility].  Appears there has been known liver metastases and then more recently a new enlarging area in July, not worked up or evaluated further at that point continuing follow-up.  Now admitted for other reasons with rapid atrial fibrillation and weakness.  May be some slight low-grade fever in the 99 range and slight leukocytosis admission.  Initial imaging of the abdomen showed  enlargement of this area and suggestion this might even be a liver abscess.  Nothing clinically to suggest a liver abscess diagnosis and looking at the imaging this does not look like enhancing liver abscess to me.    Past Medical History   I have reviewed this patient's medical history and updated it with pertinent information if needed.   Past Medical History:   Diagnosis Date    A-fib (H)     Colon cancer (H)        Past Surgical History   I have reviewed this patient's surgical history and updated it with pertinent information if needed.  No past surgical history on file.    Prior to Admission Medications   Prior to Admission Medications   Prescriptions Last Dose Informant Patient Reported? Taking?   BIOTIN PO 9/11/2024  Yes Yes   Sig: Take 1 tablet by mouth daily.   allopurinol (ZYLOPRIM) 300 MG tablet 9/11/2024  Yes Yes   Sig: Take 300 mg by mouth daily.   apixaban ANTICOAGULANT (ELIQUIS) 5 MG tablet 9/11/2024 at am  Yes Yes   Sig: Take 5 mg by mouth 2 times daily.   bumetanide (BUMEX) 1 MG tablet 9/11/2024  Yes Yes   Sig: Take 1 mg by mouth daily.   dapagliflozin (FARXIGA) 10 MG TABS tablet 9/11/2024  Yes Yes   Sig: Take 10 mg by mouth daily.   finasteride (PROSCAR) 5 MG tablet 9/11/2024  Yes Yes   Sig: Take 5 mg by mouth daily.   metoprolol succinate ER (TOPROL XL) 100 MG 24 hr tablet 9/11/2024  No No   Sig: Take 2 tablets (200 mg) by mouth daily.   multivitamin w/minerals (THERA-VIT-M) tablet 9/11/2024  Yes Yes   Sig: Take 1 tablet by mouth daily.   psyllium (METAMUCIL/KONSYL) 58.6 % powder 9/11/2024  Yes Yes   Sig: Take 1 Tablespoonful by mouth daily.   spironolactone (ALDACTONE) 25 MG tablet 9/11/2024  Yes Yes   Sig: Take 25 mg by mouth daily.   vitamin D2 (ERGOCALCIFEROL) 02387 units (1250 mcg) capsule 9/11/2024  Yes Yes   Sig: Take 50,000 Units by mouth once a week. On Wednesdays      Facility-Administered Medications: None     Allergies   Allergies   Allergen Reactions    Codeine Other (See Comments)  "    Does not like    Hydrocodone-Acetaminophen Other (See Comments)     Does not like    Oxycodone-Acetaminophen Other (See Comments)     Does not like    Venlafaxine Other (See Comments)       Immunization History   Immunization History   Administered Date(s) Administered    COVID-19 12+ (MODERNA) 12/20/2023    COVID-19 Bivalent 12+ (Pfizer) 12/22/2022    COVID-19 MONOVALENT 12+ (Pfizer) 02/03/2021, 02/24/2021, 10/29/2021    COVID-19 Monovalent 12+ (Pfizer 2022) 06/14/2022       Social History   I have reviewed this patient's social history and updated it with pertinent information if needed. Kristina Ramirez  reports that she has never smoked. She does not have any smokeless tobacco history on file. She reports that she does not drink alcohol and does not use drugs.    Family History   I have reviewed this patient's family history and updated it with pertinent information if needed.   No family history on file.    Review of Systems   The 10 point Review of Systems is negative    Physical Exam   Temp: 98.4  F (36.9  C) Temp src: Oral BP: 124/54 Pulse: 82   Resp: 19 SpO2: 92 % O2 Device: None (Room air)    Vital Signs with Ranges  Temp:  [97.6  F (36.4  C)-99.3  F (37.4  C)] 98.4  F (36.9  C)  Pulse:  [75-89] 82  Resp:  [16-19] 19  BP: ()/(45-66) 124/54  SpO2:  [92 %-95 %] 92 %  137 lbs 0 oz  Body mass index is 23.88 kg/m .    GENERAL APPEARANCE:  awake and looks quite well considering the diagnoses  EYES: Eyes grossly normal to inspection  NECK: no adenopathy  RESP: lungs clear   CV: regular rates and rhythm  LYMPHATICS: normal ant/post cervical and supraclavicular nodes  ABDOMEN: soft, nontender  MS: extremities normal  SKIN: no suspicious lesions or rashes        Data   All laboratory and imaging data in the past 24 hours reviewed  No results for input(s): \"CULT\" in the last 168 hours.  No lab results found.    Invalid input(s): \"UC\"       All cultures:  Recent Labs   Lab 09/12/24  1221   CULTURE No " growth after 12 hours      Blood culture:  Results for orders placed or performed during the hospital encounter of 09/11/24   Blood Culture Hand, Left    Specimen: Hand, Left; Blood   Result Value Ref Range    Culture No growth after 12 hours       Urine culture:  No results found for this or any previous visit.

## 2024-09-13 NOTE — PROGRESS NOTES
I saw and examined this patient today.  Heart rate much better controlled with the addition of diltiazem.    I reviewed imaging including CT scan and abdominal ultrasound showing suspected hepatic metastases.  There was question of possible abscess, but seems unlikely given her current clinical status (no fevers or infectious sx) and the fact that this mass is also present on recent US imaging from July 2024 (see care everywhere).      Pt can discharge home today

## 2024-09-13 NOTE — PROGRESS NOTES
Reviewed infection disease note.  I also reviewed the imaging results with the radiologist today.  Hepatic abscess felt to be very unlikely; likely findings on CT scan are liver mets.  Okay to discharge home

## 2024-09-13 NOTE — PLAN OF CARE
"On ABX Zosyn Q6 hours. Tele Afib Cs Plan for possible discharge today,m      Problem: Adult Inpatient Plan of Care  Goal: Plan of Care Review  Description: The Plan of Care Review/Shift note should be completed every shift.  The Outcome Evaluation is a brief statement about your assessment that the patient is improving, declining, or no change.  This information will be displayed automatically on your shift  note.  Outcome: Progressing  Flowsheets (Taken 9/12/2024 2221)  Outcome Evaluation: Tele Afib CVR, Started PO diltiazem. On IV Zosyn, Tele Afib CVR. Reports that her weakness has improved.  Overall Patient Progress: improving  Goal: Patient-Specific Goal (Individualized)  Description: You can add care plan individualizations to a care plan. Examples of Individualization might be:  \"Parent requests to be called daily at 9am for status\", \"I have a hard time hearing out of my right ear\", or \"Do not touch me to wake me up as it startles  me\".  Outcome: Progressing  Goal: Absence of Hospital-Acquired Illness or Injury  Outcome: Progressing  Intervention: Identify and Manage Fall Risk  Recent Flowsheet Documentation  Taken 9/12/2024 1715 by Cassie Smalls RN  Safety Promotion/Fall Prevention: nonskid shoes/slippers when out of bed  Intervention: Prevent Skin Injury  Recent Flowsheet Documentation  Taken 9/12/2024 1715 by Cassie Smalls RN  Body Position: position changed independently  Intervention: Prevent Infection  Recent Flowsheet Documentation  Taken 9/12/2024 1715 by Cassie Smalls RN  Infection Prevention: hand hygiene promoted  Goal: Optimal Comfort and Wellbeing  Outcome: Progressing  Goal: Readiness for Transition of Care  Outcome: Progressing     Problem: Skin Injury Risk Increased  Goal: Skin Health and Integrity  Outcome: Progressing  Intervention: Optimize Skin Protection  Recent Flowsheet Documentation  Taken 9/12/2024 1715 by Cassie Smalls RN  Activity Management: activity adjusted per " tolerance  Head of Bed (HOB) Positioning: HOB at 30 degrees     Problem: Fall Injury Risk  Goal: Absence of Fall and Fall-Related Injury  Outcome: Progressing  Intervention: Identify and Manage Contributors  Recent Flowsheet Documentation  Taken 9/12/2024 1715 by Cassie Smalls RN  Medication Review/Management: medications reviewed  Intervention: Promote Injury-Free Environment  Recent Flowsheet Documentation  Taken 9/12/2024 1715 by Cassie Smalls RN  Safety Promotion/Fall Prevention: nonskid shoes/slippers when out of bed     Problem: Comorbidity Management  Goal: Maintenance of Heart Failure Symptom Control  Outcome: Progressing  Intervention: Maintain Heart Failure Management  Recent Flowsheet Documentation  Taken 9/12/2024 1715 by Cassie Smalls RN  Medication Review/Management: medications reviewed  Goal: Blood Pressure in Desired Range  Outcome: Progressing  Intervention: Maintain Blood Pressure Management  Recent Flowsheet Documentation  Taken 9/12/2024 1715 by Cassie Smalls RN  Medication Review/Management: medications reviewed   Goal Outcome Evaluation:           Overall Patient Progress: improvingOverall Patient Progress: improving    Outcome Evaluation: Tele Afib CVR, Started PO diltiazem. On IV Zosyn, Tele Afib CVR. Reports that her weakness has improved.

## 2024-09-13 NOTE — PROGRESS NOTES
[facility]  Cardiology Progress Note    Outpatient cardiologist: Dr. Polk ([facility] cardiology)     Date of Service (when I saw the patient): 09/13/2024      Summary: Kristina Ramirez is a 83 year old female with history of atrial fibrillation, heart failure with preserved ejection fraction, severe TR, metastatic colon cancer (untreated), who was admitted on 9/11/2024.       Interval History   Tele: afib CVR    She is feeling better and would like to go home         Assessment & Plan   Permanent atrial fibrillation, now with RVR in setting of suspected infection (UTI?)  - Rate controlled on Toprol  mg BID and dilt SR 90 mg BID  - Eliquis 5 mg BID. Her hgb is a little on the low side here, did get IVF which may be contributing, but also had some bleeding in the past. She is hypercoagulable with her cancer. Continue Eliquis but will lower to 2.5 mg BID, with hgb repeated Monday at PCP.   If any bleeding, will need to reweigh risks/benefits.       HFmrEF, stable  - EF 45-50%  - PTA on Farxiga, spironolactone, Bumex 1 mg  - Currently on Bumex 0.5 mg (lower dose than PTA)  - Farxiga held due to current UTI  - Spironolactone held with borderline BPs    Mod-severe to severe TR    Metastatic cancer (untreated), colon to cervix     Possible UTI    Anemia       Ok to discharge from a cardiology perspective on BB and dilt and close follow up with her primary cardiology team at [facility].   She needs hgb Monday with PCP.   Cardiology will sign off.         Siobhan Segura PA-C      Patient Active Problem List   Diagnosis    Atrial fibrillation with RVR (H)    Atrial fibrillation with rapid ventricular response (H)       Physical Exam   Temp: 98.4  F (36.9  C) Temp src: Oral BP: 104/45 Pulse: 85   Resp: 18 SpO2: 92 % O2 Device: None (Room air)    Vitals:    09/11/24 2052 09/12/24 0536 09/13/24 0348   Weight: 61.9 kg (136 lb 8 oz) 61.6 kg (135 lb 12.8 oz) 62.1 kg (137 lb)     Vital Signs with  Ranges  Temp:  [97.6  F (36.4  C)-99.3  F (37.4  C)] 98.4  F (36.9  C)  Pulse:  [75-97] 85  Resp:  [16-18] 18  BP: ()/(44-66) 104/45  SpO2:  [92 %-95 %] 92 %  I/O last 3 completed shifts:  In: 3 [I.V.:3]  Out: 2200 [Urine:2200]    Constitutional: NAD.   Respiratory: CTAB.   Cardiovascular: irr irr, I/VI systolic murmur  GI: soft, BS+  Skin: warm, no rashes  Musculoskeletal: Moving all extremities  Neurologic: Alert, oriented x 3  Neuropsychiatric: Normal affect       Data   Recent Labs   Lab 09/13/24  0851 09/12/24  0835 09/11/24  1630   WBC 12.2* 14.8* 12.4*   HGB 10.3* 11.4* 13.1   MCV 96 95 96    275 358    134* 133*   POTASSIUM 3.6 3.6 3.6   CHLORIDE 102 98 94*   CO2 21* 22 24   BUN 14.4 16.0 20.8   CR 0.92 0.89 1.04*   ANIONGAP 12 14 15   AGAPITO 8.7* 8.7* 9.5   * 130* 115*   ALBUMIN 3.1*  --   --    PROTTOTAL 6.7  --   --    BILITOTAL 1.2  --   --    ALKPHOS 284*  --   --    ALT 18  --   --    AST 42  --   --        Recent Results (from the past 24 hour(s))   CT Chest/Abdomen/Pelvis w Contrast    Narrative    CT CHEST/ABDOMEN/PELVIS WITH CONTRAST 9/12/2024 1:49 PM    CLINICAL HISTORY: History of colon with liver mets. Possible infection  of unclear source.    TECHNIQUE: CT scan of the chest, abdomen, and pelvis was performed  following injection of IV contrast. Multiplanar reformats were  obtained. Dose reduction techniques were used.   CONTRAST: 68mL Isovue-370    COMPARISON: None.    FINDINGS:   LUNGS AND PLEURA: No consolidation or effusion.    MEDIASTINUM/AXILLAE: Cardiomegaly. No adenopathy in the chest. No  aneurysm.    CORONARY ARTERY CALCIFICATIONS: Trace    HEPATOBILIARY: Low dense lesion in the right lobe of liver measuring  8.3 cm. Presumably this is a metastasis compatible with history. By  imaging this would be difficult to delineate from an abscess.  Additional low dense lesions too small to characterize. Contracted  gallbladder.    PANCREAS: No significant mass, duct  dilatation, or inflammatory  change.    SPLEEN: Normal size.    ADRENAL GLANDS: No significant nodules.    KIDNEYS/BLADDER: No significant mass, stones, or hydronephrosis.    BOWEL: No obstruction or inflammatory change.    PELVIC ORGANS: Presumed fibroid uterus.    ADDITIONAL FINDINGS: There are moderate atherosclerotic changes of the  visualized aorta and its branches. There is no evidence of aortic  dissection or aneurysm.    Retroperitoneal adenopathy noted with a right retroperitoneal node  adjacent to the IVC measuring 2.4 x 1.6 cm.    MUSCULOSKELETAL: No frankly destructive bony lesions.      Impression    IMPRESSION:  1.  8.3 cm lesion in the central right lobe of liver, presumably this  is a metastasis, be difficult to delineate this by imaging from an  abscess. Comparison studies may be useful for further evaluation of  this finding.  2.  Otherwise no infection demonstrated.     JOSEPH BARRAZA MD         SYSTEM ID:  [id number]   US Abdomen Limited    Narrative    US ABDOMEN LIMITED 9/12/2024 4:29 PM    CLINICAL HISTORY: Liver mass noted on CT.  TECHNIQUE: Limited abdominal ultrasound.  COMPARISON: CT of the chest, abdomen, and pelvis 9/12/2024.    FINDINGS:  GALLBLADDER: Shadowing gallstones are noted within the gallbladder.  The gallbladder appears contracted. Gallbladder wall thickening  measures up to 0.7 cm, likely related to gallbladder contraction. No  pericholecystic fluid. Negative sonographic Rivera's sign.    BILE DUCTS: There is no biliary dilatation. The common duct measures 4  mm. Portions of the common duct could not be visualized due to  overlying bowel gas.    LIVER: Heterogeneously echogenic mass in the right hepatic lobe  measures 9.4 x 6.8 x 7.4 cm. There is no definite color Doppler blood  flow identified within this liver mass. No evidence for hepatic  steatosis.    RIGHT KIDNEY: Unremarkable. No hydronephrosis.    PANCREAS: The visualized portions of the pancreas are unremarkable.    No  ascites.      Impression    IMPRESSION:  1.  A 9.4 cm heterogeneously echogenic right hepatic mass demonstrates  no convincing internal blood flow on color Doppler imaging. This  finding is favored to represent a hypovascular neoplastic mass,  although given the lack of color Doppler blood flow, an abscess cannot  be excluded. A liver MRI with IV gadolinium may be helpful for further  characterization.  2.  Cholelithiasis.  3.  Diffuse gallbladder wall thickening is likely related to  gallbladder contraction.     LEDA LYNCH MD         SYSTEM ID:  [id number]     Echo 9/12/24:  Interpretation Summary     The rhythm was atrial fibrillation.  With rapid atrial fibrillation, the visual approximation of left ventricular  systolic function may be falsely decreased.  Left ventricular systolic function is mildly reduced.  The visual ejection fraction is 45-50%.  The left ventricle is normal in size.  The right ventricle is mildly dilated.  The right ventricular systolic function is normal.  There is severe biatrial enlargement.  There is moderately severe (3+) tricuspid regurgitation.  Right ventricle systolic pressure estimate normal     No old studies for comparison          Medications   Current Facility-Administered Medications   Medication Dose Route Frequency Provider Last Rate Last Admin    Patient is already receiving anticoagulation with heparin, enoxaparin (LOVENOX), warfarin (COUMADIN)  or other anticoagulant medication   Does not apply Continuous PRN Orestes Okeefe MD         Current Facility-Administered Medications   Medication Dose Route Frequency Provider Last Rate Last Admin    allopurinol (ZYLOPRIM) tablet 300 mg  300 mg Oral Daily Orestes Okeefe MD   300 mg at 09/13/24 0943    apixaban ANTICOAGULANT (ELIQUIS) tablet 5 mg  5 mg Oral BID Orestes Okeefe MD   5 mg at 09/13/24 0925    bumetanide (BUMEX) tablet 0.5 mg  0.5 mg Oral Daily Jose Raul Luque MD   0.5 mg at 09/13/24 0943    diltiazem ER  (CARDIZEM SR) 12 hr capsule 90 mg  90 mg Oral BID Jose Raul Luque MD   90 mg at 09/12/24 2126    finasteride (PROSCAR) tablet 5 mg  5 mg Oral Daily Orestes Okeefe MD   5 mg at 09/13/24 0943    metoprolol succinate ER (TOPROL XL) 24 hr tablet 100 mg  100 mg Oral BID Orestes Okeefe MD   100 mg at 09/13/24 0925    piperacillin-tazobactam (ZOSYN) 3.375 g vial to attach to  mL bag  3.375 g Intravenous Q6H Yahir García  mL/hr at 09/13/24 0328 3.375 g at 09/13/24 0328    psyllium (METAMUCIL/KONSYL) Packet 1 packet  1 packet Oral Daily Orestes Okeefe MD   1 packet at 09/13/24 0943    sodium chloride (PF) 0.9% PF flush 3 mL  3 mL Intracatheter Q8H Orestes Okeefe MD   3 mL at 09/13/24 0436    sodium chloride (PF) 0.9% PF flush 3 mL  3 mL Intracatheter Q8H Octavio Ramirez MD   3 mL at 09/13/24 0944    [Held by provider] spironolactone (ALDACTONE) tablet 25 mg  25 mg Oral Daily Orestes Okeefe MD

## 2024-09-13 NOTE — PROGRESS NOTES
"/54 (BP Location: Left arm)   Pulse 82   Temp 98.4  F (36.9  C) (Oral)   Resp 19   Ht 1.613 m (5' 3.5\")   Wt 62.1 kg (137 lb)   SpO2 92%   BMI 23.88 kg/m      General: Alert and decision making. VSS on RA  Neuro: AxOx4  Resp: Lungs clear on RA  Cards: Tele a-fib CVR inverted Ts; Bps hypotensive  GI: Denies pain, no n/v/d  : up to toilet  Mobility: SBA  Diet: Low fat Low sodium    L/D - Removed     Plan: Discharge home; follow up outpatient with PCP    All belongings returned. Medications reviewed. Care plan objectives met. Transport home via family personal vehicle.  "

## 2024-09-13 NOTE — DISCHARGE SUMMARY
[facility]  Hospitalist Discharge Summary      Date of Admission:  9/11/2024  Date of Discharge:  9/13/2024  2:53 PM  Discharging Provider: Epi Martinez MD  Discharge Service: Hospitalist Service    Discharge Diagnoses   Rapid atrial fibrillation  -Appreciate cardiology input this admission  -Continue Toprol XL for rate management  -Diltiazem added this admission for additional rate control  -Eliquis continued for anticoagulation    Metastatic colon cancer  -Known metastatic disease to the liver and cervix  -Followed by [facility] in the past  -She has had previous colon resection and chemotherapy  -Reports she recently declined FOLFOX therapy    Hepatic mass noted on CT imaging this admission  -Suspect metastatic disease from colon cancer  -Mass size on CT imaging this admission measures 8.3 cm  -Most recent imaging study from 7/24 in care everywhere was an abdominal ultrasound showing intrahepatic mass size of 7 x 4.9 x 6.4 cm    Past medical history  Atrial fibrillation  Chronic anticoagulation with Eliquis  Previous cardioversion for atrial fibrillation  Systolic heart failure  -Most recent echocardiogram showed ejection fraction 47% on 8/26/2024  -Echocardiogram performed 9/11/2024 showed ejection fraction 45 to 50%  Moderate to severe tricuspid valve regurgitation    Clinically Significant Risk Factors          Follow-ups Needed After Discharge   Follow-up Appointments     Follow-up and recommended labs and tests       See primary MD in 5-7 days to reassess heart rate control after addition   of diltiazem.  Also recommend to recheck hemoglobin at that visit.    Hemoglobin is near 10 on discharge from the hospital    Medication changes:  Eliquis dose decreased from 5 mg twice a day to 2.5   mg twice a day            Unresulted Labs Ordered in the Past 30 Days of this Admission       Date and Time Order Name Status Description    9/12/2024 12:01 PM Blood Culture Hand, Left  Preliminary             Discharge Disposition   Discharged to home  Condition at discharge: Stable    Hospital Course   83 year old female admitted on 9/11/2024 with recurrent atrial fibrillation with rapid ventricular response. She has history of permanent atrial fibrillation, previous cardioversions, hypertension, moderate to severe tricuspid valve regurgitation, peptic ulcer disease, and GERD.  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  She used to receive her care through the [facility] system in [place].  For the last year she has followed at [facility].  She had colon resection and chemotherapy.  Most recently she has been on FOLFOX through [facility].  She had radiation therapy to the metastases in her cervix.       She was admitted here from 9/9/2024 through 9/10/2024 with atrial fibrillation and rapid ventricular response.  In the previous few weeks she had been changed from metoprolol  mg daily to Coreg due to some fatigue.  This was not controlling her heart rate quite as well so she was changed back to metoprolol XL at 50 mg/day just a couple of days before presentation on 9/9/2024.  She presented to the ED on 9/9/2024 and was found to be in atrial fibrillation with rapid ventricular response.  Cardioversion was attempted 3 times without success.  She was started on diltiazem drip and was admitted for further cares.  Her heart rate improved.  She discharged home the next day on prior dose of metoprolol  mg daily with plans to follow-up [facility].  She awoke this morning with tachycardia and recurrent fatigue.  She did have some brief chest pain with palpitations.  She denies shortness of breath although does fatigue with exertion.  She came to the ED for evaluation.     ED evaluation showed heart rate in the 130s with otherwise unremarkable vital signs.  Laboratory evaluation showed white blood cells 12.4,  magnesium 1.8, TSH 3.26, and otherwise unremarkable CBC and basic metabolic panel.  She was started on diltiazem drip.  CT of chest abd pelvis showed hepatic mass, suspected to be metastasis.    The patient was seen by cardiology this admission.  Toprol-XL was continued, but her dose was divided from 200 mg daily to 100 mg twice daily.  Additionally, diltiazem 90 mg twice a day was added with much improved rate control.  Heart rates consistently remain below 100 bpm with the addition of diltiazem.    Incidentally, the patient was found to have large hepatic mass on CT imaging.  It is suspected that this is likely due to progressive hepatic metastases.  She has known hepatic metastases previously.  Her most recent imaging from outside institution (see care everywhere) was an abdominal ultrasound in 7/24 showing hepatic mass size of approximately 7 x 5 cm.  On our imaging this admission size has grown to 8.3 cm.  There was question of possible abscess, but given her history of hepatic mass and lack of infectious symptoms this almost certainly is metastases.  The patient further follow-up with her oncologist to discuss this finding.    Patient was discharged home today        Consultations This Hospital Stay   CARDIOLOGY IP CONSULT  INFECTIOUS DISEASES IP CONSULT    Code Status   Full Code    Time Spent on this Encounter   I, Epi Martinez MD, personally saw the patient today and spent less than or equal to 30 minutes discharging this patient.       Epi Martinez MD  [place] [address] [facility]  [address] [place] [address]  Phone: [phone]  Fax: [phone]  ______________________________________________________________________    Physical Exam   Vital Signs: Temp: 98.4  F (36.9  C) Temp src: Oral BP: 124/54 Pulse: 82   Resp: 19 SpO2: 92 % O2 Device: None (Room air)    Weight: 137 lbs 0 oz  Awake, alert, oriented x 3  Cardiovascular: Irregular rhythm, heart rate  normal  Pulmonary: Lungs clear to auscultation bilaterally  Abdomen: Nontender/nondistended.  Soft       Primary Care Physician   Mayelin Anderson    Discharge Orders      Reason for your hospital stay    Atrial fibrillation     Activity    Your activity upon discharge: activity as tolerated     Follow-up and recommended labs and tests     See primary MD in 5-7 days to reassess heart rate control after addition of diltiazem.  Also recommend to recheck hemoglobin at that visit.  Hemoglobin is near 10 on discharge from the hospital    Medication changes:  Eliquis dose decreased from 5 mg twice a day to 2.5 mg twice a day     Diet    Follow this diet upon discharge: regular diet       Significant Results and Procedures   Results for orders placed or performed during the hospital encounter of 09/11/24   CT Chest/Abdomen/Pelvis w Contrast    Narrative    CT CHEST/ABDOMEN/PELVIS WITH CONTRAST 9/12/2024 1:49 PM    CLINICAL HISTORY: History of colon with liver mets. Possible infection  of unclear source.    TECHNIQUE: CT scan of the chest, abdomen, and pelvis was performed  following injection of IV contrast. Multiplanar reformats were  obtained. Dose reduction techniques were used.   CONTRAST: 68mL [id number]-370    COMPARISON: None.    FINDINGS:   LUNGS AND PLEURA: No consolidation or effusion.    MEDIASTINUM/AXILLAE: Cardiomegaly. No adenopathy in the chest. No  aneurysm.    CORONARY ARTERY CALCIFICATIONS: Trace    HEPATOBILIARY: Low dense lesion in the right lobe of liver measuring  8.3 cm. Presumably this is a metastasis compatible with history. By  imaging this would be difficult to delineate from an abscess.  Additional low dense lesions too small to characterize. Contracted  gallbladder.    PANCREAS: No significant mass, duct dilatation, or inflammatory  change.    SPLEEN: Normal size.    ADRENAL GLANDS: No significant nodules.    KIDNEYS/BLADDER: No significant mass, stones, or hydronephrosis.    BOWEL: No  obstruction or inflammatory change.    PELVIC ORGANS: Presumed fibroid uterus.    ADDITIONAL FINDINGS: There are moderate atherosclerotic changes of the  visualized aorta and its branches. There is no evidence of aortic  dissection or aneurysm.    Retroperitoneal adenopathy noted with a right retroperitoneal node  adjacent to the IVC measuring 2.4 x 1.6 cm.    MUSCULOSKELETAL: No frankly destructive bony lesions.      Impression    IMPRESSION:  1.  8.3 cm lesion in the central right lobe of liver, presumably this  is a metastasis, be difficult to delineate this by imaging from an  abscess. Comparison studies may be useful for further evaluation of  this finding.  2.  Otherwise no infection demonstrated.     JOSEPH BARRAZA MD         SYSTEM ID:  [id number]   US Abdomen Limited    Narrative    US ABDOMEN LIMITED 9/12/2024 4:29 PM    CLINICAL HISTORY: Liver mass noted on CT.  TECHNIQUE: Limited abdominal ultrasound.  COMPARISON: CT of the chest, abdomen, and pelvis 9/12/2024.    FINDINGS:  GALLBLADDER: Shadowing gallstones are noted within the gallbladder.  The gallbladder appears contracted. Gallbladder wall thickening  measures up to 0.7 cm, likely related to gallbladder contraction. No  pericholecystic fluid. Negative sonographic Rivera's sign.    BILE DUCTS: There is no biliary dilatation. The common duct measures 4  mm. Portions of the common duct could not be visualized due to  overlying bowel gas.    LIVER: Heterogeneously echogenic mass in the right hepatic lobe  measures 9.4 x 6.8 x 7.4 cm. There is no definite color Doppler blood  flow identified within this liver mass. No evidence for hepatic  steatosis.    RIGHT KIDNEY: Unremarkable. No hydronephrosis.    PANCREAS: The visualized portions of the pancreas are unremarkable.    No ascites.      Impression    IMPRESSION:  1.  A 9.4 cm heterogeneously echogenic right hepatic mass demonstrates  no convincing internal blood flow on color Doppler imaging.  This  finding is favored to represent a hypovascular neoplastic mass,  although given the lack of color Doppler blood flow, an abscess cannot  be excluded. A liver MRI with IV gadolinium may be helpful for further  characterization.  2.  Cholelithiasis.  3.  Diffuse gallbladder wall thickening is likely related to  gallbladder contraction.     LEDA LYNCH MD         SYSTEM ID:  [id number]   Echocardiogram Limited     Value    LVEF  45-50%    Narrative    [id number]  [id number]  [id number]  [id number]^KEAGAN^SHREYAS^COURTNEY     [facility]  Echocardiography Laboratory  [address] [place] [address]     Name: MIO BEAL  MRN: [id number]  : 1940  Study Date: 2024 07:10 AM  Age: 83 yrs  Gender: Female  Patient Location: [facility]  Reason For Study: Atrial Fibrillation  Ordering Physician: SHREYAS BOOGIE  Referring Physician: Mayelin Anderson  Performed By: Isis Herr RDCS     BSA: 1.6 m2  Height: 63 in  Weight: 135 lb  HR: 96  BP: 128/93 mmHg  ______________________________________________________________________________  Procedure  Limited Portable Echo Adult. Optison (NDC #[id number]) given intravenously.  ______________________________________________________________________________  Interpretation Summary     The rhythm was atrial fibrillation.  With rapid atrial fibrillation, the visual approximation of left ventricular  systolic function may be falsely decreased.  Left ventricular systolic function is mildly reduced.  The visual ejection fraction is 45-50%.  The left ventricle is normal in size.  The right ventricle is mildly dilated.  The right ventricular systolic function is normal.  There is severe biatrial enlargement.  There is moderately severe (3+) tricuspid regurgitation.  Right ventricle systolic pressure estimate normal     No old studies for comparison  ______________________________________________________________________________  Left Ventricle  The left  ventricle is normal in size. There is normal left ventricular wall  thickness. With rapid atrial fibrillation, the visual approximation of left  ventricular systolic function may be falsely decreased. Left ventricular  systolic function is mildly reduced. The visual ejection fraction is 45-50%.  No regional wall motion abnormalities noted. There is no thrombus seen in the  left ventricle.     Right Ventricle  The right ventricle is mildly dilated. The right ventricular systolic function  is normal. There is no mass or thrombus in the right ventricle.     Atria  There is severe biatrial enlargement. There is no atrial shunt seen. The left  atrial appendage is not well visualized.     Mitral Valve  The mitral valve leaflets appear normal. There is no evidence of stenosis,  fluttering, or prolapse. There is trace mitral regurgitation. There is no  mitral valve stenosis.     Tricuspid Valve  The tricuspid valve is not well visualized, but is grossly normal. There is  moderately severe (3+) tricuspid regurgitation. The right ventricular systolic  pressure is approximated at 23.2 mmHg plus the right atrial pressure. Right  ventricle systolic pressure estimate normal. There is no tricuspid stenosis.     Aortic Valve  The aortic valve is trileaflet. No aortic regurgitation is present. No aortic  stenosis is present.     Pulmonic Valve  Normal pulmonic valve. There is no pulmonic valvular regurgitation. There is  no pulmonic valvular stenosis.     Vessels  The aortic root is normal size. Normal size ascending aorta. The inferior vena  cava is normal. The pulmonary artery is normal size.     Pericardium  The pericardium appears normal. There is no pleural effusion.     Rhythm  The rhythm was atrial fibrillation.  ______________________________________________________________________________  MMode/2D Measurements & Calculations  IVSd: 0.97 cm     LVIDd: 4.8 cm  LVIDs: 3.1 cm  LVPWd: 0.89 cm  FS: 35.2 %  LV mass(C)d: 152.6  grams  LV mass(C)dI: 93.2 grams/m2  EF Biplane: 51.3 %  RWT: 0.37     Doppler Measurements & Calculations  TR max nadya: 240.6 cm/sec  TR max P.2 mmHg     ______________________________________________________________________________  Report approved by: Dr. Ulices Barakat 2024 10:19 AM             Discharge Medications   Discharge Medication List as of 2024  2:17 PM        START taking these medications    Details   diltiazem ER (CARDIZEM SR) 90 MG 12 hr capsule Take 1 capsule (90 mg) by mouth 2 times daily., Disp-60 capsule, R-1, E-Prescribe           CONTINUE these medications which have CHANGED    Details   apixaban ANTICOAGULANT (ELIQUIS) 2.5 MG tablet Take 1 tablet (2.5 mg) by mouth 2 times daily., Disp-60 tablet, R-1, E-Prescribe      metoprolol succinate ER (TOPROL XL) 100 MG 24 hr tablet Take 1 tablet (100 mg) by mouth 2 times daily., No Print Out           CONTINUE these medications which have NOT CHANGED    Details   allopurinol (ZYLOPRIM) 300 MG tablet Take 300 mg by mouth daily., Historical      BIOTIN PO Take 1 tablet by mouth daily., Historical      dapagliflozin (FARXIGA) 10 MG TABS tablet Take 10 mg by mouth daily., 10 mg, Oral, DAILY, Historical      finasteride (PROSCAR) 5 MG tablet Take 5 mg by mouth daily., Historical      multivitamin w/minerals (THERA-VIT-M) tablet Take 1 tablet by mouth daily., Historical      psyllium (METAMUCIL/KONSYL) 58.6 % powder Take 1 Tablespoonful by mouth daily., Historical      spironolactone (ALDACTONE) 25 MG tablet Take 25 mg by mouth daily., Historical      vitamin D2 (ERGOCALCIFEROL) 53373 units (1250 mcg) capsule Take 50,000 Units by mouth once a week. On , Historical      bumetanide (BUMEX) 1 MG tablet Take 1 mg by mouth daily., Historical           Allergies   Allergies   Allergen Reactions    Codeine Other (See Comments)     Does not like    Hydrocodone-Acetaminophen Other (See Comments)     Does not like    Oxycodone-Acetaminophen  Other (See Comments)     Does not like    Venlafaxine Other (See Comments)

## 2024-09-15 ENCOUNTER — PATIENT OUTREACH (OUTPATIENT)
Dept: CARE COORDINATION | Facility: CLINIC | Age: 84
End: 2024-09-15
Payer: COMMERCIAL

## 2024-09-15 NOTE — PROGRESS NOTES
Clinic Care Coordination Contact  Transitions of Care Outreach    Chief Complaint   Patient presents with    Clinic Care Coordination - [facility]       Most Recent Admission Date: 9/11/2024   Most Recent Admission Diagnosis: Atrial fibrillation with rapid ventricular response (H) - I48.91     Most Recent Discharge Date: 9/13/2024   Most Recent Discharge Diagnosis: Atrial fibrillation with rapid ventricular response (H) - I48.91  Atrial fibrillation with RVR (H) - I48.91  Chronic systolic congestive heart failure (H) - I50.22     Transitions of Care Assessment    Discharge Assessment  How are you doing now that you are home?: feeling better  How are your symptoms? (Red Flag symptoms escalate to triage hotline per guidelines): Improved  Do you know how to contact your clinic care team if you have future questions or changes to your health status? : Yes  Does the patient have their discharge instructions? : Yes  Does the patient have questions regarding their discharge instructions? : No  Were you started on any new medications or were there changes to any of your previous medications? : Yes  Does the patient have all of their medications?: Yes  Do you have questions regarding any of your medications? : No  Do you have all of your needed medical supplies or equipment (DME)?  (i.e. oxygen tank, CPAP, cane, etc.): Yes    Post-op ([facility] CTA Only)  If the patient had a surgery or procedure, do they have any questions for a nurse?: No       Follow up Plan     No future appointments.  Outpatient Plan as outlined on AVS reviewed with patient.    For any urgent concerns, please contact our 24 hour nurse triage line: [phone]     Heba, CHW  [phone]  [facility]

## 2024-09-17 LAB — BACTERIA BLD CULT: NO GROWTH

## 2024-10-02 ENCOUNTER — TRANSCRIBE ORDERS (OUTPATIENT)
Dept: OTHER | Age: 84
End: 2024-10-02

## 2024-10-02 DIAGNOSIS — C18.9 METASTATIC COLON CANCER TO LIVER (H): Primary | ICD-10-CM

## 2024-10-02 DIAGNOSIS — C78.7 METASTATIC COLON CANCER TO LIVER (H): Primary | ICD-10-CM

## 2024-11-12 ENCOUNTER — APPOINTMENT (OUTPATIENT)
Age: 84
Setting detail: DERMATOLOGY
End: 2024-11-12

## 2024-11-12 VITALS — WEIGHT: 127 LBS | HEIGHT: 64 IN | RESPIRATION RATE: 14 BRPM

## 2024-11-12 DIAGNOSIS — L65.9 NONSCARRING HAIR LOSS, UNSPECIFIED: ICD-10-CM

## 2024-11-12 PROCEDURE — OTHER REASON FOR TELEMEDICINE VISIT: OTHER

## 2024-11-12 PROCEDURE — OTHER PRESCRIPTION: OTHER

## 2024-11-12 PROCEDURE — OTHER CONSENT FOR TELEMEDICINE VISIT OBTAINED: OTHER

## 2024-11-12 PROCEDURE — OTHER MIPS QUALITY: OTHER

## 2024-11-12 PROCEDURE — OTHER ADDITIONAL NOTES: OTHER

## 2024-11-12 PROCEDURE — OTHER COUNSELING: OTHER

## 2024-11-12 PROCEDURE — 99214 OFFICE O/P EST MOD 30 MIN: CPT

## 2024-11-12 RX ORDER — FINASTERIDE 5 MG/1
TABLET, FILM COATED ORAL
Qty: 90 | Refills: 4 | Status: ERX

## 2024-11-12 ASSESSMENT — LOCATION SIMPLE DESCRIPTION DERM: LOCATION SIMPLE: POSTERIOR SCALP

## 2024-11-12 ASSESSMENT — LOCATION ZONE DERM: LOCATION ZONE: SCALP

## 2024-11-12 ASSESSMENT — LOCATION DETAILED DESCRIPTION DERM: LOCATION DETAILED: POSTERIOR MID-PARIETAL SCALP

## 2024-11-12 NOTE — PROCEDURE: CONSENT FOR TELEMEDICINE VISIT OBTAINED
Consent Text: You, the patient, have initiated and requested a tele-medicine visit. You, as the patient (or in the case of a minor, the parent/legal guardian), are verbally consenting to treatment. You also understand that third party applications potentially introduce privacy risks, however, we have taken precautions and measures to protect your privacy whenever possible. You also authorize that any photos and screen shots taken during this visit will be transferred and uploaded to your permanent medical record with our practice.

## 2024-11-12 NOTE — PROCEDURE: ADDITIONAL NOTES
Additional Notes: She declines any SEs including depression. No headaches, dizziness, abdominal pain. She never started the Rogaine. She voiced not having any hair last October. She is in remission for colon cancer. \\n\\nPt was able to log in with help using her iPhone. A clinical assistant was present for the exam. Care instructions were explained to the patient in detail. Told patient to call with any concerns or questions. The patient verbalized understanding and agreement of the education provided and the treatment plan.
Detail Level: Simple
Render Risk Assessment In Note?: no

## 2025-01-05 ENCOUNTER — HOSPITAL ENCOUNTER (EMERGENCY)
Facility: CLINIC | Age: 85
Discharge: HOME | End: 2025-01-05
Attending: EMERGENCY MEDICINE

## 2025-01-05 ENCOUNTER — APPOINTMENT (OUTPATIENT)
Dept: CT IMAGING | Facility: CLINIC | Age: 85
End: 2025-01-05
Attending: EMERGENCY MEDICINE

## 2025-01-05 VITALS
RESPIRATION RATE: 17 BRPM | BODY MASS INDEX: 23.32 KG/M2 | HEIGHT: 63 IN | HEART RATE: 63 BPM | SYSTOLIC BLOOD PRESSURE: 136 MMHG | WEIGHT: 131.61 LBS | TEMPERATURE: 97 F | OXYGEN SATURATION: 96 % | DIASTOLIC BLOOD PRESSURE: 66 MMHG

## 2025-01-05 DIAGNOSIS — J18.9 PNEUMONIA OF RIGHT MIDDLE LOBE DUE TO INFECTIOUS ORGANISM: ICD-10-CM

## 2025-01-05 DIAGNOSIS — R79.89 ELEVATED SERUM CREATININE: ICD-10-CM

## 2025-01-05 DIAGNOSIS — S22.32XA CLOSED FRACTURE OF ONE RIB OF LEFT SIDE, INITIAL ENCOUNTER: ICD-10-CM

## 2025-01-05 LAB
ANION GAP SERPL CALCULATED.3IONS-SCNC: 15 MMOL/L (ref 7–15)
BASOPHILS # BLD AUTO: 0 10E3/UL (ref 0–0.2)
BASOPHILS NFR BLD AUTO: 0 %
BUN SERPL-MCNC: 47.1 MG/DL (ref 8–23)
CALCIUM SERPL-MCNC: 9.8 MG/DL (ref 8.8–10.4)
CHLORIDE SERPL-SCNC: 97 MMOL/L (ref 98–107)
CREAT SERPL-MCNC: 1.66 MG/DL (ref 0.51–0.95)
EGFRCR SERPLBLD CKD-EPI 2021: 30 ML/MIN/1.73M2
EOSINOPHIL # BLD AUTO: 0.1 10E3/UL (ref 0–0.7)
EOSINOPHIL NFR BLD AUTO: 2 %
ERYTHROCYTE [DISTWIDTH] IN BLOOD BY AUTOMATED COUNT: 15 % (ref 10–15)
GLUCOSE SERPL-MCNC: 94 MG/DL (ref 70–99)
HCO3 SERPL-SCNC: 26 MMOL/L (ref 22–29)
HCT VFR BLD AUTO: 38.4 % (ref 35–47)
HGB BLD-MCNC: 12.4 G/DL (ref 11.7–15.7)
HOLD SPECIMEN: NORMAL
HOLD SPECIMEN: NORMAL
IMM GRANULOCYTES # BLD: 0 10E3/UL
IMM GRANULOCYTES NFR BLD: 1 %
LYMPHOCYTES # BLD AUTO: 1.1 10E3/UL (ref 0.8–5.3)
LYMPHOCYTES NFR BLD AUTO: 17 %
MCH RBC QN AUTO: 30.8 PG (ref 26.5–33)
MCHC RBC AUTO-ENTMCNC: 32.3 G/DL (ref 31.5–36.5)
MCV RBC AUTO: 96 FL (ref 78–100)
MONOCYTES # BLD AUTO: 0.5 10E3/UL (ref 0–1.3)
MONOCYTES NFR BLD AUTO: 8 %
NEUTROPHILS # BLD AUTO: 4.6 10E3/UL (ref 1.6–8.3)
NEUTROPHILS NFR BLD AUTO: 73 %
NRBC # BLD AUTO: 0 10E3/UL
NRBC BLD AUTO-RTO: 0 /100
PLATELET # BLD AUTO: 296 10E3/UL (ref 150–450)
POTASSIUM SERPL-SCNC: 3.3 MMOL/L (ref 3.4–5.3)
RBC # BLD AUTO: 4.02 10E6/UL (ref 3.8–5.2)
SODIUM SERPL-SCNC: 138 MMOL/L (ref 135–145)
TROPONIN T SERPL HS-MCNC: 25 NG/L
TROPONIN T SERPL HS-MCNC: 26 NG/L
WBC # BLD AUTO: 6.4 10E3/UL (ref 4–11)

## 2025-01-05 PROCEDURE — 99285 EMERGENCY DEPT VISIT HI MDM: CPT | Mod: 25

## 2025-01-05 PROCEDURE — 80048 BASIC METABOLIC PNL TOTAL CA: CPT | Performed by: EMERGENCY MEDICINE

## 2025-01-05 PROCEDURE — 85004 AUTOMATED DIFF WBC COUNT: CPT | Performed by: EMERGENCY MEDICINE

## 2025-01-05 PROCEDURE — 36415 COLL VENOUS BLD VENIPUNCTURE: CPT | Performed by: EMERGENCY MEDICINE

## 2025-01-05 PROCEDURE — 84484 ASSAY OF TROPONIN QUANT: CPT | Performed by: EMERGENCY MEDICINE

## 2025-01-05 PROCEDURE — 250N000013 HC RX MED GY IP 250 OP 250 PS 637: Performed by: EMERGENCY MEDICINE

## 2025-01-05 PROCEDURE — 84520 ASSAY OF UREA NITROGEN: CPT | Performed by: EMERGENCY MEDICINE

## 2025-01-05 PROCEDURE — 93005 ELECTROCARDIOGRAM TRACING: CPT

## 2025-01-05 PROCEDURE — 71250 CT THORAX DX C-: CPT

## 2025-01-05 PROCEDURE — 85041 AUTOMATED RBC COUNT: CPT | Performed by: EMERGENCY MEDICINE

## 2025-01-05 RX ORDER — DOXYCYCLINE 100 MG/1
100 CAPSULE ORAL 2 TIMES DAILY
Qty: 13 CAPSULE | Refills: 0 | Status: SHIPPED | OUTPATIENT
Start: 2025-01-05 | End: 2025-01-12

## 2025-01-05 RX ORDER — POTASSIUM CHLORIDE 1.5 G/1.58G
40 POWDER, FOR SOLUTION ORAL ONCE
Status: COMPLETED | OUTPATIENT
Start: 2025-01-05 | End: 2025-01-05

## 2025-01-05 RX ORDER — ACETAMINOPHEN 500 MG
1000 TABLET ORAL ONCE
Status: COMPLETED | OUTPATIENT
Start: 2025-01-05 | End: 2025-01-05

## 2025-01-05 RX ORDER — DOXYCYCLINE 100 MG/1
100 CAPSULE ORAL ONCE
Status: COMPLETED | OUTPATIENT
Start: 2025-01-05 | End: 2025-01-05

## 2025-01-05 RX ORDER — SENNA AND DOCUSATE SODIUM 50; 8.6 MG/1; MG/1
1-2 TABLET, FILM COATED ORAL 2 TIMES DAILY PRN
Qty: 30 TABLET | Refills: 0 | Status: SHIPPED | OUTPATIENT
Start: 2025-01-05 | End: 2025-02-04

## 2025-01-05 RX ORDER — OXYCODONE HYDROCHLORIDE 5 MG/1
2.5-5 TABLET ORAL EVERY 6 HOURS PRN
Qty: 12 TABLET | Refills: 0 | Status: SHIPPED | OUTPATIENT
Start: 2025-01-05 | End: 2025-01-08

## 2025-01-05 RX ADMIN — ACETAMINOPHEN 1000 MG: 500 TABLET, FILM COATED ORAL at 17:15

## 2025-01-05 RX ADMIN — POTASSIUM CHLORIDE 40 MEQ: 1.5 POWDER, FOR SOLUTION ORAL at 18:18

## 2025-01-05 RX ADMIN — DOXYCYCLINE HYCLATE 100 MG: 100 CAPSULE ORAL at 20:25

## 2025-01-05 ASSESSMENT — ACTIVITIES OF DAILY LIVING (ADL)
ADLS_ACUITY_SCORE: 51
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
## 2025-01-06 LAB
ATRIAL RATE - MUSE: NORMAL BPM
DIASTOLIC BLOOD PRESSURE - MUSE: NORMAL MMHG
INTERPRETATION ECG - MUSE: NORMAL
P AXIS - MUSE: NORMAL DEGREES
PR INTERVAL - MUSE: NORMAL MS
QRS DURATION - MUSE: 130 MS
QT - MUSE: 456 MS
QTC - MUSE: 532 MS
R AXIS - MUSE: 257 DEGREES
SYSTOLIC BLOOD PRESSURE - MUSE: NORMAL MMHG
T AXIS - MUSE: 97 DEGREES
VENTRICULAR RATE- MUSE: 82 BPM

## 2025-01-06 NOTE — DISCHARGE INSTRUCTIONS
-Take acetaminophen 500 to 1000 mg by mouth every 4 to 6 hours as needed for pain or fever.  Do not take more than 4000 mg in 24 hours.  Do not take within 6 hours of another acetaminophen containing medication such as norco (vicodin) or percocet.    Please have your kidney function rechecked in 1 week.      Discharge Instructions  Chest Injury    You have been seen today because of a chest injury.  You may have contusion (bruise) of the chest or a rib fracture (broken bone).  Rib fractures can be hard to see on x-ray, so we cannot always be sure whether your rib is broken or bruised. Fortunately, the treatment of these injuries is usually the same, and includes pain control and preventing complications.    Generally, every Emergency Department visit should have a follow-up clinic visit with either a primary or a specialty clinic/provider. Please follow-up as instructed by your emergency provider today.    Return to the Emergency Department if:  You become short of breath.  You develop a fever over 101.5 F.  You pass out or become very weak or pale.  You have abdominal (belly) pain that is new or increasing.  You cough up blood.  You have new symptoms or anything that worries you.    Follow-up with your provider:  As directed by your provider today.  If you are not improved in two weeks.  If you need more pain medicine, since we do not refill pain pills through the Emergency Department.    Home care instructions:  Chest injuries can be painful.  You may take an over-the-counter pain medication such as Tylenol  (acetaminophen), Advil  (ibuprofen), Motrin  (ibuprofen) or Aleve  (naproxen).  Applying ice packs to the painful area can help your pain.   Holding a pillow against your chest can help with pain when you need to move or cough.  You may need to rest and avoid lifting particularly in the first few days after your injury.  Prevention of pneumonia (lung infection) is also a part of managing chest injuries.   Because it can hurt to take deep breaths, you could develop collapsed areas of lung that can develop infection.  To prevent this, you need to take ten very deep breaths every hour while you are awake. Sometimes you will be given a device called an incentive spirometer to help with this. You also need to make yourself cough every hour.  Rib belts or binders are not generally recommended, since they may increase the risk of pneumonia. If you do use one, use it for only short periods of time.   If you were given a prescription for medicine here today, be sure to read all of the information (including the package insert) that comes with your prescription.  This will include important information about the medicine, its side effects, and any warnings that you need to know about.  The pharmacist who fills the prescription can provide more information and answer questions you may have about the medicine.  If you have questions or concerns that the pharmacist cannot address, please call or return to the Emergency Department.   Remember that you can always come back to the Emergency Department if you are not able to see your regular provider in the amount of time listed above, if you get any new symptoms, or if there is anything that worries you.

## 2025-01-26 ENCOUNTER — HEALTH MAINTENANCE LETTER (OUTPATIENT)
Age: 85
End: 2025-01-26

## 2025-02-25 ENCOUNTER — TRANSCRIBE ORDERS (OUTPATIENT)
Dept: OTHER | Age: 85
End: 2025-02-25

## 2025-02-25 DIAGNOSIS — I36.1 NONRHEUMATIC TRICUSPID VALVE REGURGITATION: Primary | ICD-10-CM

## 2025-03-11 ENCOUNTER — HOSPITAL ENCOUNTER (OUTPATIENT)
Dept: CARDIAC REHAB | Facility: CLINIC | Age: 85
Discharge: HOME | End: 2025-03-11
Attending: HOSPITALIST
Payer: COMMERCIAL

## 2025-03-11 PROCEDURE — 93798 PHYS/QHP OP CAR RHAB W/ECG: CPT

## 2025-03-11 PROCEDURE — 93797 PHYS/QHP OP CAR RHAB WO ECG: CPT

## 2025-07-26 ENCOUNTER — HOSPITAL ENCOUNTER (EMERGENCY)
Facility: CLINIC | Age: 85
Discharge: HOME | End: 2025-07-26
Attending: PHYSICIAN ASSISTANT
Payer: COMMERCIAL

## 2025-07-26 ENCOUNTER — APPOINTMENT (OUTPATIENT)
Dept: CT IMAGING | Facility: CLINIC | Age: 85
End: 2025-07-26
Attending: PHYSICIAN ASSISTANT
Payer: COMMERCIAL

## 2025-07-26 VITALS
HEART RATE: 62 BPM | DIASTOLIC BLOOD PRESSURE: 64 MMHG | RESPIRATION RATE: 18 BRPM | SYSTOLIC BLOOD PRESSURE: 131 MMHG | OXYGEN SATURATION: 98 % | TEMPERATURE: 97.6 F

## 2025-07-26 DIAGNOSIS — K52.9 COLITIS: ICD-10-CM

## 2025-07-26 DIAGNOSIS — N39.0 URINARY TRACT INFECTION WITH HEMATURIA, SITE UNSPECIFIED: ICD-10-CM

## 2025-07-26 DIAGNOSIS — S32.10XA CLOSED FRACTURE OF SACRUM, UNSPECIFIED PORTION OF SACRUM, INITIAL ENCOUNTER (H): Primary | ICD-10-CM

## 2025-07-26 DIAGNOSIS — R31.9 URINARY TRACT INFECTION WITH HEMATURIA, SITE UNSPECIFIED: ICD-10-CM

## 2025-07-26 LAB
ALBUMIN SERPL BCG-MCNC: 4 G/DL (ref 3.5–5.2)
ALBUMIN UR-MCNC: NEGATIVE MG/DL
ALP SERPL-CCNC: 122 U/L (ref 40–150)
ALT SERPL W P-5'-P-CCNC: 11 U/L (ref 0–50)
ANION GAP SERPL CALCULATED.3IONS-SCNC: 14 MMOL/L (ref 7–15)
APPEARANCE UR: CLEAR
AST SERPL W P-5'-P-CCNC: 19 U/L (ref 0–45)
BILIRUB SERPL-MCNC: 0.4 MG/DL
BILIRUB UR QL STRIP: NEGATIVE
BUN SERPL-MCNC: 26.5 MG/DL (ref 8–23)
CALCIUM SERPL-MCNC: 10.3 MG/DL (ref 8.8–10.4)
CHLORIDE SERPL-SCNC: 97 MMOL/L (ref 98–107)
COLOR UR AUTO: YELLOW
CREAT SERPL-MCNC: 1.04 MG/DL (ref 0.51–0.95)
EGFRCR SERPLBLD CKD-EPI 2021: 53 ML/MIN/1.73M2
ERYTHROCYTE [DISTWIDTH] IN BLOOD BY AUTOMATED COUNT: 15.3 % (ref 10–15)
GLUCOSE SERPL-MCNC: 133 MG/DL (ref 70–99)
GLUCOSE UR STRIP-MCNC: 1000 MG/DL
HCO3 SERPL-SCNC: 26 MMOL/L (ref 22–29)
HCT VFR BLD AUTO: 37.4 % (ref 35–47)
HGB BLD-MCNC: 12.4 G/DL (ref 11.7–15.7)
HGB UR QL STRIP: NEGATIVE
HYALINE CASTS: 4 /LPF
KETONES UR STRIP-MCNC: NEGATIVE MG/DL
LEUKOCYTE ESTERASE UR QL STRIP: ABNORMAL
MCH RBC QN AUTO: 31.1 PG (ref 26.5–33)
MCHC RBC AUTO-ENTMCNC: 33.2 G/DL (ref 31.5–36.5)
MCV RBC AUTO: 94 FL (ref 78–100)
MUCOUS THREADS #/AREA URNS LPF: PRESENT /LPF
NITRATE UR QL: NEGATIVE
PH UR STRIP: 5 [PH] (ref 5–7)
PLATELET # BLD AUTO: 319 10E3/UL (ref 150–450)
POTASSIUM SERPL-SCNC: 4.2 MMOL/L (ref 3.4–5.3)
PROT SERPL-MCNC: 8.1 G/DL (ref 6.4–8.3)
RBC # BLD AUTO: 3.99 10E6/UL (ref 3.8–5.2)
RBC URINE: 2 /HPF
SODIUM SERPL-SCNC: 137 MMOL/L (ref 135–145)
SP GR UR STRIP: 1.03 (ref 1–1.03)
TROPONIN T SERPL HS-MCNC: 10 NG/L
TROPONIN T SERPL HS-MCNC: 12 NG/L
UROBILINOGEN UR STRIP-MCNC: NORMAL MG/DL
WBC # BLD AUTO: 10.5 10E3/UL (ref 4–11)
WBC URINE: 15 /HPF

## 2025-07-26 PROCEDURE — 74177 CT ABD & PELVIS W/CONTRAST: CPT

## 2025-07-26 PROCEDURE — 81003 URINALYSIS AUTO W/O SCOPE: CPT | Performed by: PHYSICIAN ASSISTANT

## 2025-07-26 PROCEDURE — 85027 COMPLETE CBC AUTOMATED: CPT | Performed by: PHYSICIAN ASSISTANT

## 2025-07-26 PROCEDURE — 81001 URINALYSIS AUTO W/SCOPE: CPT | Performed by: PHYSICIAN ASSISTANT

## 2025-07-26 PROCEDURE — 87086 URINE CULTURE/COLONY COUNT: CPT | Performed by: PHYSICIAN ASSISTANT

## 2025-07-26 PROCEDURE — 250N000011 HC RX IP 250 OP 636: Performed by: PHYSICIAN ASSISTANT

## 2025-07-26 PROCEDURE — 250N000013 HC RX MED GY IP 250 OP 250 PS 637: Performed by: PHYSICIAN ASSISTANT

## 2025-07-26 PROCEDURE — 84155 ASSAY OF PROTEIN SERUM: CPT | Performed by: PHYSICIAN ASSISTANT

## 2025-07-26 PROCEDURE — 36415 COLL VENOUS BLD VENIPUNCTURE: CPT | Performed by: PHYSICIAN ASSISTANT

## 2025-07-26 PROCEDURE — 99285 EMERGENCY DEPT VISIT HI MDM: CPT | Mod: 25 | Performed by: PHYSICIAN ASSISTANT

## 2025-07-26 PROCEDURE — 82310 ASSAY OF CALCIUM: CPT | Performed by: PHYSICIAN ASSISTANT

## 2025-07-26 PROCEDURE — 93005 ELECTROCARDIOGRAM TRACING: CPT

## 2025-07-26 PROCEDURE — 72131 CT LUMBAR SPINE W/O DYE: CPT

## 2025-07-26 PROCEDURE — 84484 ASSAY OF TROPONIN QUANT: CPT | Performed by: PHYSICIAN ASSISTANT

## 2025-07-26 RX ORDER — ACETAMINOPHEN 325 MG/1
650 TABLET ORAL ONCE
Status: COMPLETED | OUTPATIENT
Start: 2025-07-26 | End: 2025-07-26

## 2025-07-26 RX ORDER — IOPAMIDOL 755 MG/ML
500 INJECTION, SOLUTION INTRAVASCULAR ONCE
Status: COMPLETED | OUTPATIENT
Start: 2025-07-26 | End: 2025-07-26

## 2025-07-26 RX ORDER — ONDANSETRON 4 MG/1
4 TABLET, ORALLY DISINTEGRATING ORAL EVERY 8 HOURS PRN
Qty: 10 TABLET | Refills: 0 | Status: SHIPPED | OUTPATIENT
Start: 2025-07-26 | End: 2025-07-29

## 2025-07-26 RX ORDER — ONDANSETRON 4 MG/1
4 TABLET, ORALLY DISINTEGRATING ORAL ONCE
Status: COMPLETED | OUTPATIENT
Start: 2025-07-26 | End: 2025-07-26

## 2025-07-26 RX ORDER — HYDROCODONE BITARTRATE AND ACETAMINOPHEN 5; 325 MG/1; MG/1
1 TABLET ORAL EVERY 6 HOURS PRN
Qty: 8 TABLET | Refills: 0 | Status: SHIPPED | OUTPATIENT
Start: 2025-07-26 | End: 2025-07-29

## 2025-07-26 RX ORDER — CEPHALEXIN 500 MG/1
500 CAPSULE ORAL 2 TIMES DAILY
Qty: 14 CAPSULE | Refills: 0 | Status: SHIPPED | OUTPATIENT
Start: 2025-07-26 | End: 2025-08-02

## 2025-07-26 RX ORDER — HYDROCODONE BITARTRATE AND ACETAMINOPHEN 5; 325 MG/1; MG/1
1 TABLET ORAL ONCE
Refills: 0 | Status: COMPLETED | OUTPATIENT
Start: 2025-07-26 | End: 2025-07-26

## 2025-07-26 RX ADMIN — IOPAMIDOL 65 ML: 755 INJECTION, SOLUTION INTRAVENOUS at 18:26

## 2025-07-26 RX ADMIN — ONDANSETRON 4 MG: 4 TABLET, ORALLY DISINTEGRATING ORAL at 20:29

## 2025-07-26 RX ADMIN — HYDROCODONE BITARTRATE AND ACETAMINOPHEN 1 TABLET: 5; 325 TABLET ORAL at 20:29

## 2025-07-26 RX ADMIN — ACETAMINOPHEN 650 MG: 325 TABLET, FILM COATED ORAL at 20:29

## 2025-07-26 ASSESSMENT — ACTIVITIES OF DAILY LIVING (ADL)
ADLS_ACUITY_SCORE: 51

## 2025-07-26 NOTE — ED PROVIDER NOTES
Emergency Department Note      History of Present Illness     Chief Complaint   Back Pain      HPI   Kristina Ramirez is a 84 year old female with a past medical history significant for colon cancer, cervical cancer, liver metastases, CKD, and hypertension on Eliquis for atrial fibrillation who presents with her  for evaluation of right lower back pain. The patient reports that about a month and a half ago, she began having pain in her left hip. She had fallen out of bed several days before pain started. She had a lumbar x-rays that showed no fracture. Patient reports the pain resolved however returned over the past week on the right side. She presents as pain is not responding to Tylenol at home. Pain is at the right hip with radiation into right buttock. She reports some numbness within the right buttock but no numbness currently. She is ambulating with a limp. She denies fevers, saddle anesthesia, urinary and bowel incontinence, urinary retention, and lower extremity numbness or weakness.  Laying flat on her back and sitting in a chair both help alleviate some of the back pain. She has tried tylenol, ibuprofen, and tramadol and none of them have alleviated her back pain. She was seen by her provider yesterday who prescribed morphine. She has taken the morphine twice, once last night and once at 0730 this morning. It did not alleviate her pain.   Patient also reports dysuria, abdominal pain, and diarrhea for months. She is concerned some of these symptoms may be related to her cancer. She also had several episodes of nausea and vomiting earlier today. She is currently not following oncology.    Independent Historian    as detailed above.    Review of External Notes   I reviewed the patient's x-ray results from 07/15/2025   I reviewed the patient's primary care provider call from 07/25/2025 - Provided morphine tablets.     Past Medical History     Medical History and Problem List   A-fib   Colon  cancer   Liver neoplasm   Ventricular tachycardia   CKD stage 3a  Cervical cancer  Peptic ulcer disease  Mitral valve insufficiency  Tricuspid valve insufficiency  Atrial enlargement, bilateral  Sensorineural hearing loss, bilateral  Hypertension   GERD    Medications   Farxiga  Bumetanide  Allopurinol   Finasteride  Metoprolol Succinate  Ergocalciferol   Eliquis     Surgical History   Wrist fracture repair  Tonsillectomy  Right colon resection     Physical Exam     Patient Vitals for the past 24 hrs:   BP Temp Temp src Pulse Resp SpO2   07/26/25 2031 131/64 -- -- 62 18 98 %   07/26/25 1623 108/85 97.6  F (36.4  C) Oral 64 18 96 %     Physical Exam  Constitutional: Alert, attentive, GCS 15  HENT:    Nose: Nose normal.    Mouth/Throat: Oropharynx is clear, mucous membranes are moist   Eyes: EOM are normal. Pupils equal and reactive.  Neck: Normal range of motion. No rigidity.  CV: regular rate and rhythm; no murmurs, rubs or gallups  Chest: Effort normal and breath sounds normal.   GI:  Minimal suprapubic tenderness without rebound. No distension. Normal bowel sounds  MSK: Normal range of motion of all four extremities. Negative leg raises.   Lumbar spine: reproducible midline tenderness and right iliac crest tenderness.   Neurological: Alert, attentive, Oriented x 3. No facial asymmetry. CN II-XII intact. 5/5 strength in upper and lower extremities. Normal range of motion in all four extremities. Distal sensation in hands and feet intact. Limping gait.  Skin: Skin is warm and dry.     Diagnostics     Lab Results   Labs Ordered and Resulted from Time of ED Arrival to Time of ED Departure   CBC WITH PLATELETS (LIMITED OCCURRENCES) - Abnormal       Result Value    WBC Count 10.5      RBC Count 3.99      Hemoglobin 12.4      Hematocrit 37.4      MCV 94      MCH 31.1      MCHC 33.2      RDW 15.3 (*)     Platelet Count 319     COMPREHENSIVE METABOLIC PANEL (LIMITED OCCURRENCES) - Abnormal    Sodium 137      Potassium 4.2       Carbon Dioxide (CO2) 26      Anion Gap 14      Urea Nitrogen 26.5 (*)     Creatinine 1.04 (*)     GFR Estimate 53 (*)     Calcium 10.3      Chloride 97 (*)     Glucose 133 (*)     Alkaline Phosphatase 122      AST 19      ALT 11      Protein Total 8.1      Albumin 4.0      Bilirubin Total 0.4     ROUTINE UA WITH MICROSCOPIC REFLEX TO CULTURE - Abnormal    Color Urine Yellow      Appearance Urine Clear      Glucose Urine 1000 (*)     Bilirubin Urine Negative      Ketones Urine Negative      Specific Gravity Urine 1.026      Blood Urine Negative      pH Urine 5.0      Protein Albumin Urine Negative      Urobilinogen Urine Normal      Nitrite Urine Negative      Leukocyte Esterase Urine Moderate (*)     Mucus Urine Present (*)     RBC Urine 2      WBC Urine 15 (*)     Hyaline Casts Urine 4 (*)    TROPONIN T, HIGH SENSITIVITY - Normal    Troponin T, High Sensitivity 12     TROPONIN T, HIGH SENSITIVITY - Normal    Troponin T, High Sensitivity 10     URINE CULTURE     Imaging   CT Abdomen Pelvis w Contrast   Final Result   IMPRESSION:    1.  Decreasing tumor burden with decreasing size of the cervical mass with decreasing pelvic metastasis, retroperitoneal metastases, and liver metastasis since 2/25/2025.      2.  New Mild colitis involving the sigmoid colon likely radiation induced.      3.  New left sacral insufficiency fracture.      CT Lumbar Spine w/o Contrast   Final Result    IMPRESSION:   1.  Left sacrum vertical fracture. Suspect this is acute or subacute. Findings are new compared to CT abdomen pelvis 2/25/2025. Mild presacral hemorrhage.      2.  No acute fracture within the lumbar spine.      3.  Chronic spine changes described above.        EKG   ECG results from 07/26/25   EKG 12 lead     Value    Systolic Blood Pressure     Diastolic Blood Pressure     Ventricular Rate 62    Atrial Rate 357    MS Interval     QRS Duration 140        QTc 495    P Axis     R AXIS -51    T Axis 44    Interpretation  ECG      Ventricular-paced rhythm with occasional supraventricular complexes  Read by me at 1823       Independent Interpretation   None    ED Course      Medications Administered   Medications   HYDROcodone-acetaminophen (NORCO) 5-325 MG per tablet 1 tablet (1 tablet Oral $Given 7/26/25 2029)   acetaminophen (TYLENOL) tablet 650 mg (650 mg Oral $Given 7/26/25 2029)   sodium chloride (PF) 0.9% PF flush 100 mL (56 mLs Intravenous $Given 7/26/25 1826)   iopamidol (ISOVUE-370) solution 500 mL (65 mLs Intravenous $Given 7/26/25 1826)   ondansetron (ZOFRAN ODT) ODT tab 4 mg (4 mg Oral $Given 7/26/25 2029)     Procedures   Procedures     Discussion of Management   None    ED Course   ED Course as of 07/27/25 0008   Sat Jul 26, 2025 1632 I obtained history and examined the patient as noted above.    1655 I rechecked and updated the patient.    2040 I rechecked and updated the patient.    2054 We discussed plan for discharge and the patient is comfortable with this.        Additional Documentation  None    Medical Decision Making / Diagnosis     CMS Diagnoses: None    MIPS   CT/MRI of the lumbar spine was obtained because of risk factors for fracture (minor trauma in elderly/osteoporosis).              [facility]   Kristina Ramirez is a 84 year old female with metastatic colon cancer and atrial fibrillation on Eliquis who presents for several complaints including right low back and hip pain over the past week and also abdominal pain with diarrhea and dysuria.  Vitals are normal.  Physical examination reveals minimal suprapubic tenderness without peritoneal signs.  There is also reproducible tenderness along the low lumbar/sacral area.  Her neurological exam is otherwise unremarkable and there are focal neurological deficits.  She is ambulatory though has a limp due to pain. There has been no urinary or bowel incontinence, retention, saddle anesthesia, weakness or numbness in lower extremities.  Differential includes cystitis,  colitis, vertebral fracture/subluxation.  Given no neurological symptoms though she does have a cancer history, I have low suspicion for significant spinal stenosis, cauda equina, epidural abscess, or transverse myelitis. Will pursue CT to evaluate for fracture.  Labs today overall reassuring without leukocytosis or anemia.  No gross electrolyte abnormality.  Renal function is appropriate today given history of CKD. UA is suspicious for infection and suspect UTI may be contributing to dysuria and abdominal discomfort.  Lumbar CT reveals acute to subacute sacral fracture which is new from her February CT.  Suspect this is likely the cause of back pain today and likely sustained during a fall from bed 6 weeks ago.  CT scan of the abdomen reveals evidence of radiation colitis which would also explain diarrhea.  Results reviewed with patient at bedside. Back pain likely attributed to sacral fracture however no neurological compromise noted.  Patient is ambulatory with a walker and I discussed hospitalization for further evaluation however patient adamantly declines stating that she does not want stay in the hospital.  Given that she is ambulatory, will trial a short course of pain medications.  She has intolerances to several pain medications but no true allergies. Will start a short course of Norco and she may stop the morphine from her primary care provider. Patient may also benefit from gabapentin which she will discuss with her primary care provider. Keflex provided for UTI coverage while waiting culture and orthopedic contact information provided.  Discussed return precautions including any new neurological symptoms, weakness, numbness, or worsening pain.  Patient comfortable plan and she is discharged home.    Disposition   The patient was discharged.    Diagnosis     ICD-10-CM    1. Closed fracture of sacrum, unspecified portion of sacrum, initial encounter (H)  S32.10XA       2. Colitis  K52.9       3. Urinary  tract infection with hematuria, site unspecified  N39.0     R31.9            Discharge Medications   Discharge Medication List as of 7/26/2025  8:59 PM        START taking these medications    Details   cephALEXin (KEFLEX) 500 MG capsule Take 1 capsule (500 mg) by mouth 2 times daily for 7 days., Disp-14 capsule, R-0, E-Prescribe      HYDROcodone-acetaminophen (NORCO) 5-325 MG tablet Take 1 tablet by mouth every 6 hours as needed for severe pain., Disp-8 tablet, R-0, E-Prescribe      ondansetron (ZOFRAN ODT) 4 MG ODT tab Take 1 tablet (4 mg) by mouth every 8 hours as needed for nausea., Disp-10 tablet, R-0, E-Prescribe               Scribe Disclosure:  I, Lilo Green, am serving as a scribe at 4:29 PM on 7/26/2025 to document services personally performed by Teresa Aguilera PA-C based on my observations and the provider's statements to me.        Teresa Aguilera PA-C  07/27/25 0008

## 2025-07-26 NOTE — ED TRIAGE NOTES
Pt has been having lower back pain for last couple months. It started on the left lower side and has since moved to the right. Last couple weeks pain has been getting worse. Talked to clinic yesterday and pt was prescribed morphine which is not helping. Unable to walk without severe pain. Today pt has started having NV. Hx cervical cancer. Abd pain, diarrhea, and tan vaginal drainage has been going on for the last month.

## 2025-07-27 NOTE — DISCHARGE INSTRUCTIONS
"Imaging shows evidence of a sacral fracture which is likely the source of your pain. Imaging also shows radiation-induced inflammation of the colon called colitis. Recommendations including rest, ice, walker for movement, \"donut\" pillow, and pain medication. Stop taking morphine tablets. Follow-up with primary care and orthopedics at contact information provided. Monitor symptoms and return with any worsening symptoms such as pain, numbness, leg weakness, urinary or bowel incontinence or retention.   "

## 2025-07-28 LAB
ATRIAL RATE - MUSE: 357 BPM
BACTERIA UR CULT: NORMAL
DIASTOLIC BLOOD PRESSURE - MUSE: NORMAL MMHG
INTERPRETATION ECG - MUSE: NORMAL
P AXIS - MUSE: NORMAL DEGREES
PR INTERVAL - MUSE: NORMAL MS
QRS DURATION - MUSE: 140 MS
QT - MUSE: 488 MS
QTC - MUSE: 495 MS
R AXIS - MUSE: -51 DEGREES
SYSTOLIC BLOOD PRESSURE - MUSE: NORMAL MMHG
T AXIS - MUSE: 44 DEGREES
VENTRICULAR RATE- MUSE: 62 BPM